# Patient Record
Sex: MALE | Race: WHITE | NOT HISPANIC OR LATINO | Employment: FULL TIME | URBAN - METROPOLITAN AREA
[De-identification: names, ages, dates, MRNs, and addresses within clinical notes are randomized per-mention and may not be internally consistent; named-entity substitution may affect disease eponyms.]

---

## 2017-01-13 ENCOUNTER — OFFICE VISIT (OUTPATIENT)
Dept: DERMATOLOGY | Facility: CLINIC | Age: 52
End: 2017-01-13
Payer: COMMERCIAL

## 2017-01-13 DIAGNOSIS — Z86.018 HISTORY OF DYSPLASTIC NEVUS: ICD-10-CM

## 2017-01-13 DIAGNOSIS — D48.5 NEOPLASM OF UNCERTAIN BEHAVIOR OF SKIN: Primary | ICD-10-CM

## 2017-01-13 PROCEDURE — 88305 TISSUE EXAM BY PATHOLOGIST: CPT | Performed by: DERMATOLOGY

## 2017-01-13 PROCEDURE — 11100 HC BIOPSY SKIN/SUBQ/MUC MEM, SINGLE LESION: CPT | Performed by: DERMATOLOGY

## 2017-01-13 PROCEDURE — 99213 OFFICE O/P EST LOW 20 MIN: CPT | Mod: 25 | Performed by: DERMATOLOGY

## 2017-01-13 RX ORDER — MULTIVIT WITH MINERALS/LUTEIN
1 TABLET ORAL DAILY
COMMUNITY
End: 2023-05-23

## 2017-01-13 NOTE — PROGRESS NOTES
[facility] Dermatology Note      Dermatology Problem List:  1. History of dysplastic nevus  -Junctional dysplastic nevus with mild atypia, left lower back, s/p biopsy, narrowly excised, 11/23/2015  -Compound dysplastic nevus with mild atypia, mid back, s/p biopsy 5/19/2015  -Compound dysplastic nevus with mild to moderate atypia, right lower back, s/p excised with biopsy 5/19/2015  -Compound dysplastic nevus with mild to moderate atypia, central abdomen, s/p biopsy 5/19/2015, s/p re-excision for recurrent pigment 2/2/2016  -Compound dysplastic nevus with severe atypia, left chest, s/p excision 1/13/2015  2. Lesion on left calf excised as a child, approximately age 4    Encounter Date: Jan 13, 2017    CC:  Chief Complaint   Patient presents with     Derm Problem     6 month skin check. Has some itchy spots on the back           History of Present Illness:  Mr. Gatito Collier is a 51 year old male who presents as a follow-up for history of dysplastic nevi. The patient was last seen 6/21/2016 when a biopsy was performed on the right calf. Today, the patient reports that his prior biopsy site on the right calf has healed well. Has some pruritic lesions on the back. The patient reports no other lesions of concern.    Past Medical History:   Patient Active Problem List   Diagnosis     Allergic rhinitis     Benign neoplasm of skin     CARDIOVASCULAR SCREENING; LDL GOAL LESS THAN 160     History of dysplastic nevus     Past Medical History   Diagnosis Date     Calculus of gallbladder without mention of cholecystitis or obstruction      Cholelithiasis     Unspecified disease of pancreas 2/00?     Pancreatitis/ Gall stone     Benign neoplasm of skin, site unspecified      dysplastic nevi     Allergic rhinitis, cause unspecified      Allergic rhinitis     Past Surgical History   Procedure Laterality Date     Hc remove tonsils/adenoids,<11 y/o       T & A <12y.o. 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     Family History:  There is a family history of skin cancer in the patient's maternal grandfather.    Medications:  Current Outpatient Prescriptions   Medication Sig Dispense Refill     loratadine (CLARITIN) 10 MG tablet        Multiple Vitamins-Minerals (PRESERVISION AREDS 2) CAPS Take by mouth daily            Allergies   Allergen Reactions     No Known Drug Allergies          Review of Systems:  -Skin: As above in HPI. No additional skin concerns.  -Const: The patient is generally feeling well today.   Physical exam:  GEN: This is a well developed, well-nourished male in no acute distress, in a pleasant mood.    SKIN: Full skin, which includes the head/face, both arms, chest, back, abdomen,both legs, genitalia and/or groin buttocks, digits and/or nails, was examined.  -There are multiple well healed surgical scars without erythema, nodularity or telangiectasias.  -5mm irregularly shaped pigmented macule on the left lower paraspinal.  -Normal back exam  -There is a well healed surgical scar without erythema, nodularity or telangiectasias or pigmentation, several on the back and chest  -No other lesions of concern on areas examined.     Impression/Plan:  1. History of dysplastic nevi, no clinical evidence of recurrence.   ABCD's of melanoma were reviewed with patient and handout provided.   Recommend sunscreens SPF #30 or greater, protective clothing and avoidance of tanning beds.  2. Pruritus, back. Location not consistent with nostalgia paresthetica, possibly due to scars around the area.     Discussed no evidence of malignancy    Patient declines discussion of Sarna.     No further intervention required at this time.   3. 5mm irregularly shaped pigmented macule, left lower paraspinal. Neoplasm of uncertain behavior. Differential diagnosis  includes dysplastic nevus versus other    Shave biopsy:  After discussion of benefits and risks including but not limited to bleeding/bruising, pain/swelling, infection, scar, incomplete removal, nerve damage/numbness, recurrence, and non-diagnostic biopsy, written consent, verbal consent and photographs were obtained. Time-out was performed. The area was cleaned with isopropyl alcohol. 0.5 mL of 1% lidocaine with epinephrine was injected to obtain adequate anesthesia of the lesion on the left lower paraspinal. A shave biopsy was performed. Hemostasis was achieved with aluminium chloride. Vaseline and a sterile dressing were applied. The patient tolerated the procedure and no complications were noted. The patient was provided with verbal and written post care instructions.     Follow up in 6 months, earlier pending biopsy, earlier for new or changing lesions.     Staff Involved:  Scribe/Staff    Scribe Disclosure:   I, Teena Carias, am serving as a scribe to document services personally performed by Dr. Marisela Juarez, based on data collection and the provider's statements to me.       Provider Disclosure:   I agree with above History, Review of Systems, Physical exam and Plan. I have reviewed the content of the documentation and have edited it as needed. I have personally performed the services documented here and the documentation accurately represents those services and the decisions I have made.     Marisela Juarez MD    Department of Dermatology  [facility]: Phone: [phone], Fax:[phone]  [facility]: Phone: [phone], Fax: [phone]

## 2017-01-13 NOTE — Clinical Note
1/13/2017      RE: Gatito Collier  [address]  [place] [address]       [facility] Dermatology Note      Dermatology Problem List:  1. History of dysplastic nevus  -Junctional dysplastic nevus with mild atypia, left lower back, s/p biopsy, narrowly excised, 11/23/2015  -Compound dysplastic nevus with mild atypia, mid back, s/p biopsy 5/19/2015  -Compound dysplastic nevus with mild to moderate atypia, right lower back, s/p excised with biopsy 5/19/2015  -Compound dysplastic nevus with mild to moderate atypia, central abdomen, s/p biopsy 5/19/2015, s/p re-excision for recurrent pigment 2/2/2016  -Compound dysplastic nevus with severe atypia, left chest, s/p excision 1/13/2015  2. Lesion on left calf excised as a child, approximately age 4    Encounter Date: Jan 13, 2017    CC:  Chief Complaint   Patient presents with     Derm Problem     6 month skin check. Has some itchy spots on the back           History of Present Illness:  Mr. Gatito Collier is a 51 year old male who presents as a follow-up for history of dysplastic nevi. The patient was last seen 6/21/2016 when a biopsy was performed on the right calf. Today, the patient reports that his prior biopsy site on the right calf has healed well. Has some pruritic lesions on the back. The patient reports no other lesions of concern.    Past Medical History:   Patient Active Problem List   Diagnosis     Allergic rhinitis     Benign neoplasm of skin     CARDIOVASCULAR SCREENING; LDL GOAL LESS THAN 160     History of dysplastic nevus     Past Medical History   Diagnosis Date     Calculus of gallbladder without mention of cholecystitis or obstruction      Cholelithiasis     Unspecified disease of pancreas 2/00?     Pancreatitis/ Gall stone     Benign neoplasm of skin, site unspecified      dysplastic nevi     Allergic rhinitis, cause unspecified      Allergic rhinitis     Past Surgical History   Procedure Laterality Date     Hc  remove tonsils/adenoids,<13 y/o       T & A <12y.o. 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     Family History:  There is a family history of skin cancer in the patient's maternal grandfather.    Medications:  Current Outpatient Prescriptions   Medication Sig Dispense Refill     loratadine (CLARITIN) 10 MG tablet        Multiple Vitamins-Minerals (PRESERVISION AREDS 2) CAPS Take by mouth daily            Allergies   Allergen Reactions     No Known Drug Allergies          Review of Systems:  -Skin: As above in HPI. No additional skin concerns.  -Const: The patient is generally feeling well today.   Physical exam:  GEN: This is a well developed, well-nourished male in no acute distress, in a pleasant mood.    SKIN: Full skin, which includes the head/face, both arms, chest, back, abdomen,both legs, genitalia and/or groin buttocks, digits and/or nails, was examined.  -There are multiple well healed surgical scars without erythema, nodularity or telangiectasias.  -5mm irregularly shaped pigmented macule on the left lower paraspinal.  -Normal back exam  -There is a well healed surgical scar without erythema, nodularity or telangiectasias or pigmentation, several on the back and chest  -No other lesions of concern on areas examined.     Impression/Plan:  1. History of dysplastic nevi, no clinical evidence of recurrence.   ABCD's of melanoma were reviewed with patient and handout provided.   Recommend sunscreens SPF #30 or greater, protective clothing and avoidance of tanning beds.  2. Pruritus, back. Location not consistent with nostalgia paresthetica, possibly due to scars around the area.     Discussed no evidence of malignancy    Patient declines discussion of Sarna.     No further intervention required at this time.   3. 5mm irregularly shaped pigmented  macule, left lower paraspinal. Neoplasm of uncertain behavior. Differential diagnosis includes dysplastic nevus versus other    Shave biopsy:  After discussion of benefits and risks including but not limited to bleeding/bruising, pain/swelling, infection, scar, incomplete removal, nerve damage/numbness, recurrence, and non-diagnostic biopsy, written consent, verbal consent and photographs were obtained. Time-out was performed. The area was cleaned with isopropyl alcohol. 0.5 mL of 1% lidocaine with epinephrine was injected to obtain adequate anesthesia of the lesion on the left lower paraspinal. A shave biopsy was performed. Hemostasis was achieved with aluminium chloride. Vaseline and a sterile dressing were applied. The patient tolerated the procedure and no complications were noted. The patient was provided with verbal and written post care instructions.     Follow up in 6 months, earlier pending biopsy, earlier for new or changing lesions.     Staff Involved:  Scribe/Staff    Scribe Disclosure:   I, Teena Carias, am serving as a scribe to document services personally performed by Dr. Marisela Juarez, based on data collection and the provider's statements to me.       Provider Disclosure:   I agree with above History, Review of Systems, Physical exam and Plan. I have reviewed the content of the documentation and have edited it as needed. I have personally performed the services documented here and the documentation accurately represents those services and the decisions I have made.     Marisela Juarez MD    Department of Dermatology  [facility]: Phone: [phone], Fax:[phone]  [facility]: Phone: [phone], Fax: [phone]

## 2017-01-13 NOTE — PATIENT INSTRUCTIONS

## 2017-01-13 NOTE — Clinical Note
"    Patient:  Love Weinstein  :   1965  MRN:     [id number]        Mr.Theodore DIANA Weinstein  [address] [address]        2017    Dear Love Weinstein,     We are writing to inform you of your test results that show a mildly dysplastic nevus. We can recheck this at your follow-up. No more treatment is needed at this time.     Thank you for taking the time to be seen in our dermatology clinic. If you have further questions or concerns, please contact the clinic(see phone number listed below).       Sincerely,     Marisela Juarez MD      Department of Dermatology   [facility]: Phone: [phone], Fax:[phone]   [facility]: Phone: [phone], Fax: [phone]     Resulted Orders   Surgical pathology exam   Result Value Ref Range    Copath Report       Patient Name: LOVE WEINSTEIN  MR#: [id number]  Specimen #:   Collected: 2017  Received: 2017  Reported: 2017 15:48  Ordering Phy(s): MARISELA JUAREZ    For improved result formatting, select 'View Enhanced Report Format'  under Linked Documents section.    SPECIMEN(S):  Shave, left lower paraspinal    FINAL DIAGNOSIS:  Skin, paraspinal, left lower:  - Compound nevus with mild dysplasia - (see description)    I have personally reviewed all specimens and or slides, including the  listed special stains, and used them with my medical judgement to  determine the final diagnosis.    Electronically signed out by:    Francisco Martin D.O., [facility]    CLINICAL HISTORY:  The patient is a 51-year-old male.    GROSS:  The specimen is received in formalin with proper patient's  identification, labeled \"skin biopsy of left lower paraspinal\".  it  consists of a 0.7 x 0.6 x 0.1 cm shave skin biopsy tissue fragment.  The  skin surface shows a pigmented lesion. The base maribel in is inked in  black.  Trisected " and entirely submitted in one cassette. (Dictated by:  Javier Andujar 1/16/2017 10:53 AM)    MICROSCOPIC:  Histologic sections demonstrate a compound melanocytic proliferation  with slight junctional architectural disorder.  Neither pagetoid  disarray nor cytologic atypia is prominent.    CPT Codes:  A: 31124-KX6.T, 17159-ST5.P    TESTING LAB LOCATION:  [facility], [address]   [address] [phone]    COLLECTION SITE:  Client: [facility]  Location: [facility] ()

## 2017-01-13 NOTE — NURSING NOTE
Dermatology Rooming Note    Gatito Collier's goals for this visit include:   Chief Complaint   Patient presents with     Derm Problem     6 month skin check. Has some itchy spots on the back         Is a scribe okay for this visit:YES    Are records needed for this visit(If yes, obtain release of information): Not applicable     Vitals: There were no vitals taken for this visit.    Referring Provider:  ESTABLISHED PATIENT  No address on file

## 2017-01-13 NOTE — MR AVS SNAPSHOT
After Visit Summary   1/13/2017    Gatito Collier    MRN: [id number]           Patient Information     Date Of Birth          1965        Visit Information        Provider Department      1/13/2017 11:15 AM Marisela Juarez MD [facility]        Today's Diagnoses     Neoplasm of uncertain behavior of skin    -  1     History of dysplastic nevus           Care Instructions    Wound Care After a Biopsy    What is a skin biopsy?  A skin biopsy allows the doctor to examine a very small piece of tissue under the microscope to determine the diagnosis and the best treatment for the skin condition. A local anesthetic (numbing medicine)  is injected with a very small needle into the skin area to be tested. A small piece of skin is taken from the area. Sometimes a suture (stitch) is used.     What are the risks of a skin biopsy?  I will experience scar, bleeding, swelling, pain, crusting and redness. I may experience incomplete removal or recurrence. Risks of this procedure are excessive bleeding, bruising, infection, nerve damage, numbness, thick (hypertrophic or keloidal) scar and non-diagnostic biopsy.    How should I care for my wound for the first 24 hours?    Keep the wound dry and covered for 24 hours    If it bleeds, hold direct pressure on the area for 15 minutes. If bleeding does not stop then go to the emergency room    Avoid strenuous exercise the first 1-2 days or as your doctor instructs you    How should I care for the wound after 24 hours?    After 24 hours, remove the bandage    You may bathe or shower as normal    If you had a scalp biopsy, you can shampoo as usual and can use shower water to clean the biopsy site daily    Clean the wound twice a day with gentle soap and water    Do not scrub, be gentle    Apply white petroleum/Vaseline after cleaning the wound with a cotton swab or a clean finger, and keep the site covered with a Bandaid /bandage. Bandages are not  necessary with a scalp biopsy    If you are unable to cover the site with a Bandaid /bandage, re-apply ointment 2-3 times a day to keep the site moist. Moisture will help with healing    Avoid strenuous activity for first 1-2 days    Avoid lakes, rivers, pools, and oceans until the stitches are removed or the site is healed    How do I clean my wound?    Wash hands for 15 minutes with soap or use hand  before all wound care    Clean the wound with gentle soap and water    Apply white petroleum/Vaseline  to wound after it is clean    Replace the Bandaid /bandage to keep the wound covered for the first few days or as instructed by your doctor    If you had a scalp biopsy, warm shower water to the area on a daily basis should suffice    What should I use to clean my wound?     Cotton-tipped applicators (Qtips )    White petroleum jelly (Vaseline ). Use a clean new container and use Q-tips to apply.    Bandaids   as needed    Gentle soap     How should I care for my wound long term?    Do not get your wound dirty    Keep up with wound care for one week or until the area is healed.    A small scab will form and fall off by itself when the area is completely healed. The area will be red and will become pink in color as it heals. Sun protection is very important for how your scar will turn out. Sunscreen with an SPF 30 or greater is recommended once the area is healed.    You should have some soreness but it should be mild and slowly go away over several days. Talk to your doctor about using tylenol for pain,    When should I call my doctor?  If you have increased:     Pain or swelling    Pus or drainage (clear or slightly yellow drainage is ok)    Temperature over 100F    Spreading redness or warmth around wound    When will I hear about my results?  The biopsy results can take 2-3 weeks to come back. The clinic will call you with the results, send you a Konoz message, or have you schedule a follow-up clinic or  phone time to discuss the results. Contact our clinics if you do not hear from us in 3 weeks.     Who should I call with questions?    [facility]: [phone]     [facility]: [phone]    For urgent needs outside of business hours call the [facility] at [phone] and ask for the dermatology resident on call            Follow-ups after your visit        Your next 10 appointments already scheduled     Aug 11, 2017  8:45 AM   Return Visit with Marisela Juarez MD   [facility] ([facility]    [address] [place] [address] [phone]              Who to contact     If you have questions or need follow up information about today's clinic visit or your schedule please contact [facility] directly at [phone].  Normal or non-critical lab and imaging results will be communicated to you by MyChart, letter or phone within 4 business days after the clinic has received the results. If you do not hear from us within 7 days, please contact the clinic through [employer] or phone. If you have a critical or abnormal lab result, we will notify you by phone as soon as possible.  Submit refill requests through [employer] or call your pharmacy and they will forward the refill request to us. Please allow 3 business days for your refill to be completed.          Additional Information About Your Visit        [employer] Information     [employer] gives you secure access to your electronic health record. If you see a primary care provider, you can also send messages to your care team and make appointments. If you have questions, please call your primary care clinic.  If you do not have a primary care provider, please call [phone] and they will assist you.      [employer] is an electronic gateway that provides easy, online access to your medical records. With [employer], you can request a clinic  appointment, read your test results, renew a prescription or communicate with your care team.     To access your existing account, please contact your [facility] or call [phone] for assistance.        Care EveryWhere ID     This is your Care EveryWhere ID. This could be used by other organizations to access your [facility] medical records  [id number]         Blood Pressure from Last 3 Encounters:   02/02/16 128/71   01/13/15 118/72   10/10/07 106/62    Weight from Last 3 Encounters:   01/13/15 81.647 kg (180 lb)   10/10/07 83.462 kg (184 lb)   01/23/07 84.369 kg (186 lb)              We Performed the Following     BIOPSY SKIN/SUBQ/MUC MEM, SINGLE LESION     Surgical pathology exam        Primary Care Provider Office Phone # Fax #    Gregory G Schoen, [phone]       [facility] [address] [place] [address]        Thank you!     Thank you for choosing [facility]  for your care. Our goal is always to provide you with excellent care. Hearing back from our patients is one way we can continue to improve our services. Please take a few minutes to complete the written survey that you may receive in the mail after your visit with us. Thank you!             Your Updated Medication List - Protect others around you: Learn how to safely use, store and throw away your medicines at www.disposemymeds.org.          This list is accurate as of: 1/13/17 11:37 AM.  Always use your most recent med list.                   Brand Name Dispense Instructions for use    loratadine 10 MG tablet    CLARITIN         * PRESERVISION AREDS 2 Caps      Take by mouth daily       * CENTRUM SILVER per tablet      Take 1 tablet by mouth daily       * Notice:  This list has 2 medication(s) that are the same as other medications prescribed for you. Read the directions carefully, and ask your doctor or other care provider to review them with you.

## 2017-01-18 LAB — COPATH REPORT: NORMAL

## 2017-07-25 ENCOUNTER — TELEPHONE (OUTPATIENT)
Dept: DERMATOLOGY | Facility: CLINIC | Age: 52
End: 2017-07-25

## 2017-07-25 NOTE — TELEPHONE ENCOUNTER
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     I left a message for patient to call [facility].  Patient has an appointment with Dr. hamilton on 8/11/17 for skin check.  Provider is not available and appointment needs to be rescheduled.       Call Center - ok for you to notify patient of the following:    We are recruiting dermatologists, but unfortunately we do not have a dermatologist at [facility] to reschedule you with at this time.    The following is a list of dermatology providers that will be able to see you during this transition period.  They are able to see your current dermatology medical record.  Hours will vary by location.    MD Aspen Walters PA Kristen McCarthy, PA  [facility]  [address] [place] [address]  [phone]    Ramses Arevalo MD  [facility]  [address] [place] [address]  [phone]    [facility]    Dermatology Department  [address] [place] [address]  [phone]

## 2017-11-20 ENCOUNTER — TELEPHONE (OUTPATIENT)
Dept: SURGERY | Facility: CLINIC | Age: 52
End: 2017-11-20

## 2017-11-20 NOTE — TELEPHONE ENCOUNTER
Received orders from [facility] to schedule a colonoscopy. Patient called and is scheduled on 12/1/17 with Dr. Kunz arrive at 0630 procedure 0730.  Patient needs prep instructions, he will  in ED on 11/27/17.

## 2017-12-01 ENCOUNTER — HOSPITAL ENCOUNTER (OUTPATIENT)
Facility: CLINIC | Age: 52
Discharge: HOME | End: 2017-12-01
Attending: INTERNAL MEDICINE | Admitting: INTERNAL MEDICINE
Payer: COMMERCIAL

## 2017-12-01 ENCOUNTER — SURGERY (OUTPATIENT)
Age: 52
End: 2017-12-01

## 2017-12-01 VITALS
HEART RATE: 71 BPM | RESPIRATION RATE: 16 BRPM | OXYGEN SATURATION: 95 % | DIASTOLIC BLOOD PRESSURE: 72 MMHG | SYSTOLIC BLOOD PRESSURE: 100 MMHG | TEMPERATURE: 97.1 F

## 2017-12-01 LAB — COLONOSCOPY: NORMAL

## 2017-12-01 PROCEDURE — 25000125 ZZHC RX 250: Performed by: INTERNAL MEDICINE

## 2017-12-01 PROCEDURE — 88305 TISSUE EXAM BY PATHOLOGIST: CPT | Performed by: INTERNAL MEDICINE

## 2017-12-01 PROCEDURE — 45380 COLONOSCOPY AND BIOPSY: CPT | Performed by: INTERNAL MEDICINE

## 2017-12-01 PROCEDURE — 88305 TISSUE EXAM BY PATHOLOGIST: CPT | Mod: 26 | Performed by: INTERNAL MEDICINE

## 2017-12-01 PROCEDURE — 25000128 H RX IP 250 OP 636: Performed by: INTERNAL MEDICINE

## 2017-12-01 PROCEDURE — 40000296 ZZH STATISTIC ENDO RECOVERY CLASS 1:2 FIRST HOUR: Performed by: INTERNAL MEDICINE

## 2017-12-01 PROCEDURE — G0500 MOD SEDAT ENDO SERVICE >5YRS: HCPCS

## 2017-12-01 RX ORDER — FENTANYL CITRATE 50 UG/ML
INJECTION, SOLUTION INTRAMUSCULAR; INTRAVENOUS PRN
Status: DISCONTINUED | OUTPATIENT
Start: 2017-12-01 | End: 2017-12-01 | Stop reason: HOSPADM

## 2017-12-01 RX ORDER — LIDOCAINE 40 MG/G
CREAM TOPICAL
Status: DISCONTINUED | OUTPATIENT
Start: 2017-12-01 | End: 2017-12-01 | Stop reason: HOSPADM

## 2017-12-01 RX ORDER — ONDANSETRON 2 MG/ML
4 INJECTION INTRAMUSCULAR; INTRAVENOUS
Status: DISCONTINUED | OUTPATIENT
Start: 2017-12-01 | End: 2017-12-01 | Stop reason: HOSPADM

## 2017-12-01 RX ADMIN — MIDAZOLAM HYDROCHLORIDE 1 MG: 1 INJECTION, SOLUTION INTRAMUSCULAR; INTRAVENOUS at 09:14

## 2017-12-01 RX ADMIN — MIDAZOLAM HYDROCHLORIDE 1 MG: 1 INJECTION, SOLUTION INTRAMUSCULAR; INTRAVENOUS at 09:15

## 2017-12-01 RX ADMIN — FENTANYL CITRATE 50 MCG: 50 INJECTION, SOLUTION INTRAMUSCULAR; INTRAVENOUS at 09:17

## 2017-12-01 RX ADMIN — FENTANYL CITRATE 50 MCG: 50 INJECTION, SOLUTION INTRAMUSCULAR; INTRAVENOUS at 09:12

## 2017-12-01 RX ADMIN — MIDAZOLAM HYDROCHLORIDE 1 MG: 1 INJECTION, SOLUTION INTRAMUSCULAR; INTRAVENOUS at 09:13

## 2017-12-01 RX ADMIN — MIDAZOLAM HYDROCHLORIDE 2 MG: 1 INJECTION, SOLUTION INTRAMUSCULAR; INTRAVENOUS at 09:12

## 2017-12-01 RX ADMIN — LIDOCAINE HYDROCHLORIDE 1 ML: 10 INJECTION, SOLUTION EPIDURAL; INFILTRATION; INTRACAUDAL; PERINEURAL at 08:19

## 2017-12-01 NOTE — CONSULTS
[facility] GI Pre-Procedure Physical Assessment    Gatito Collier MRN# [id number]   Age: 52 year old YOB: 1965      Date of Surgery: 12/1/2017  Location [facility]      Date of Exam 12/1/2017 Facility (Same day)       Home clinic: [facility]  Primary care provider: Schoen, Gregory G         Active problem list:   Patient Active Problem List   Diagnosis     Allergic rhinitis     Benign neoplasm of skin     CARDIOVASCULAR SCREENING; LDL GOAL LESS THAN 160     History of dysplastic nevus            Medications (include herbals and vitamins):   Any Plavix use in the last 7 days?  No     Current Facility-Administered Medications   Medication     lidocaine 1 % 1 mL     lidocaine (LMX4) kit     sodium chloride (PF) 0.9% PF flush 3 mL     sodium chloride (PF) 0.9% PF flush 3 mL     ondansetron (ZOFRAN) injection 4 mg             Allergies:      Allergies   Allergen Reactions     No Known Drug Allergies      Allergy to Latex?  No  Allergy to tape?    No          Social History:     Social History   Substance Use Topics     Smoking status: Never Smoker     Smokeless tobacco: Not on file     Alcohol use 2.0 oz/week            Physical Exam:   All vitals have been reviewed  Blood pressure 118/74, pulse 81, temperature 97.1  F (36.2  C), temperature source Oral, resp. rate 14, SpO2 96 %.  Airway assessment:   Patient is able to open mouth wide  Patient is able to stick out tongue      Lungs:   No increased work of breathing, good air exchange, clear to auscultation bilaterally, no crackles or wheezing      Cardiovascular:   Normal apical impulse, regular rate and rhythm, normal S1 and S2, no S3 or S4, and no murmur noted           Lab / Radiology Results:   All laboratory data reviewed          Assessment:   Appropriately NPO  Chief complaint or anatomic assessment of involved area: hematochezia and screening         Plan:   Moderate (conscious) sedation     Patient's  active problems diagnostically and therapeutically optimized for the planned procedure  Risks, benefits, alternatives to sedation and blood explained and consent obtained  Risks, benefits, alternatives to procedure explained and consent obtained  Orders and progress notes are in the chart  Discharge from Phase 1 and / or Phase 2 recovery when patient meets criteria    I have reviewed the history and physical, lab finding(s), diagnostic data, medicaitons, and the plan for sedation.  I have determined this patient to be an appropriate candidate for the planned sedation / procedure and have reassessed the patient immediately prior to sedation / procedure.    I have personally and medically directed the administration of medications used.    Michael Knuz MD

## 2017-12-05 LAB — COPATH REPORT: NORMAL

## 2017-12-06 ENCOUNTER — TRANSFERRED RECORDS (OUTPATIENT)
Dept: HEALTH INFORMATION MANAGEMENT | Facility: CLINIC | Age: 52
End: 2017-12-06

## 2018-03-23 ENCOUNTER — OFFICE VISIT (OUTPATIENT)
Dept: DERMATOLOGY | Facility: CLINIC | Age: 53
End: 2018-03-23
Payer: COMMERCIAL

## 2018-03-23 DIAGNOSIS — L82.1 SEBORRHEIC KERATOSIS: ICD-10-CM

## 2018-03-23 DIAGNOSIS — D22.9 MULTIPLE BENIGN NEVI: Primary | ICD-10-CM

## 2018-03-23 DIAGNOSIS — Z86.018 HISTORY OF DYSPLASTIC NEVUS: ICD-10-CM

## 2018-03-23 PROCEDURE — 99213 OFFICE O/P EST LOW 20 MIN: CPT | Performed by: DERMATOLOGY

## 2018-03-23 NOTE — PROGRESS NOTES
[facility] Dermatology Note      Dermatology Problem List:  1. History of dysplastic nevus  -Compound nevus with mild dysplasia, left lower paraspinal, s/p biopsy 1/13/2017   -Junctional dysplastic nevus with mild atypia, left lower back, s/p biopsy, narrowly excised, 11/23/2015  -Compound dysplastic nevus with mild atypia, mid back, s/p biopsy 5/19/2015  -Compound dysplastic nevus with mild to moderate atypia, right lower back, s/p excised with biopsy 5/19/2015  -Compound dysplastic nevus with mild to moderate atypia, central abdomen, s/p biopsy 5/19/2015, s/p re-excision for recurrent pigment 2/2/2016  -Compound dysplastic nevus with severe atypia, left chest, s/p excision 1/13/2015  2. Lesion on left calf excised as a child, approximately age 4    Encounter Date: Mar 23, 2018    CC:  Chief Complaint   Patient presents with     RECHECK     6 month recheck- spot on nose where glasses sit          History of Present Illness:  Mr. Gatito Collier is a 52 year old male who presents as a follow-up for spot on the nose where glasses sit. The patient was last seen 1/13/2017 when the patient was treated with biopsies. Only 1 lesion near nose he would like checked today. No further intervention required at this time.       Past Medical History:   Patient Active Problem List   Diagnosis     Allergic rhinitis     Benign neoplasm of skin     CARDIOVASCULAR SCREENING; LDL GOAL LESS THAN 160     History of dysplastic nevus     Past Medical History:   Diagnosis Date     Allergic rhinitis, cause unspecified     Allergic rhinitis     Benign neoplasm of skin, site unspecified     dysplastic nevi     Calculus of gallbladder without mention of cholecystitis or obstruction     Cholelithiasis     Unspecified disease of pancreas 2/00?    Pancreatitis/ Gall stone     Past Surgical History:   Procedure Laterality Date     COLONOSCOPY N/A 12/1/2017    Procedure: COMBINED COLONOSCOPY, SINGLE OR MULTIPLE  BIOPSY/POLYPECTOMY BY BIOPSY;  colonoscopy. POlypectomy per forceps;  Surgeon: Michael Kuzn MD;  Location: [facility]     HC EXC BENIGN SKIN LESION TRUNK/ARM/LEG 2.1-3.0 CM      birthmark removal, Left calf - approx age 4     HC REMOVE TONSILS/ADENOIDS,<13 Y/O      T & A <12y.o. 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    Kept in chart for convenience.     Family History:  There is a family history of skin cancer in the patient's maternal grandfather.  Kept in chart for convenience.       Medications:  Current Outpatient Prescriptions   Medication Sig Dispense Refill     Multiple Vitamins-Minerals (CENTRUM SILVER) per tablet Take 1 tablet by mouth daily       loratadine (CLARITIN) 10 MG tablet        Multiple Vitamins-Minerals (PRESERVISION AREDS 2) CAPS Take by mouth daily         Allergies   Allergen Reactions     No Known Drug Allergies        Review of Systems:  -Constitutional: The patient is feeling generally well.   -Skin: As above in HPI. No additional skin concerns.    Physical exam:  Vitals: There were no vitals taken for this visit.  GEN: This is a well developed, well-nourished male in no acute distress, in a pleasant mood.    SKIN: Full skin, which includes the head/face, both arms, chest, back, abdomen,both legs, genitalia and/or groin buttocks, digits and/or nails, was examined  -There is no erythema, telangectasias, nodularity, or pigmentation on the left lower back, mid back, right lower back, central abdomen, left chest.  -Multiple regular brown pigmented macules and papules are identified on the trunk and extremities.   -No other lesions of concern on areas examined.       Impression/Plan:  1. History of DN: no evidence of recurrence     ABCDs of melanoma were discussed and self skin checks were advised.     Increased risk for melanoma discussed     Recommend sunscreens SPF #30 or greater, protective clothing and avoidance of tanning  beds.    2. Multiple benign nevi     No further intervention required at this time.   3. Right medial canthus/nasal sidewall, early SK, will monitor    Follow-up in 9 months, earlier for new or changing lesions.       Staff Involved:  Scribe/Staff      Scribe Disclosure:   I, Edith Kori, am serving as a scribe to document services personally performed by Dr. Marisela Juarez, based on data collection and the provider's statements to me.       Provider Disclosure:   The documentation recorded by the scribe accurately reflects the services I personally performed and the decisions made by me.    Marisela Juarez MD    Department of Dermatology  [facility]: Phone: [phone], Fax:[phone]  [facility] [place]: Phone: [phone], Fax: [phone]    Provider Disclosure:   The documentation recorded by the scribe accurately reflects the services I personally performed and the decisions made by me.    Marisela Juarez MD    Department of Dermatology  [facility]: Phone: [phone], Fax:[phone]  [facility] [place]: Phone: [phone], Fax: [phone]

## 2018-03-23 NOTE — NURSING NOTE
"Gatito Collier's goals for this visit include:   Chief Complaint   Patient presents with     RECHECK     6 month recheck- spot on nose where glasses sit        He requests these members of his care team be copied on today's visit information: yes     PCP: Schoen, Gregory G    Referring Provider:  ESTABLISHED PATIENT  No address on file    Chief Complaint   Patient presents with     RECHECK     6 month recheck- spot on nose where glasses sit        Initial There were no vitals taken for this visit. Estimated body mass index is 24.08 kg/(m^2) as calculated from the following:    Height as of 10/10/07: 1.842 m (6' 0.5\").    Weight as of 1/13/15: 81.6 kg (180 lb).  Medication Reconciliation: complete    Do you need any medication refills at today's visit? No     Amorrae MARCO ANTONIO Morton        "

## 2018-03-23 NOTE — LETTER
3/23/2018         RE: Gatito Collier  [address] [address]        Dear Colleague,    Thank you for referring your patient, Gatito Collier, to the [facility]. Please see a copy of my visit note below.    [facility] Dermatology Note      Dermatology Problem List:  1. History of dysplastic nevus  -Compound nevus with mild dysplasia, left lower paraspinal, s/p biopsy 1/13/2017   -Junctional dysplastic nevus with mild atypia, left lower back, s/p biopsy, narrowly excised, 11/23/2015  -Compound dysplastic nevus with mild atypia, mid back, s/p biopsy 5/19/2015  -Compound dysplastic nevus with mild to moderate atypia, right lower back, s/p excised with biopsy 5/19/2015  -Compound dysplastic nevus with mild to moderate atypia, central abdomen, s/p biopsy 5/19/2015, s/p re-excision for recurrent pigment 2/2/2016  -Compound dysplastic nevus with severe atypia, left chest, s/p excision 1/13/2015  2. Lesion on left calf excised as a child, approximately age 4    Encounter Date: Mar 23, 2018    CC:  Chief Complaint   Patient presents with     RECHECK     6 month recheck- spot on nose where glasses sit          History of Present Illness:  Mr. Gatito Collier is a 52 year old male who presents as a follow-up for spot on the nose where glasses sit. The patient was last seen 1/13/2017 when the patient was treated with biopsies. Only 1 lesion near nose he would like checked today. No further intervention required at this time.       Past Medical History:   Patient Active Problem List   Diagnosis     Allergic rhinitis     Benign neoplasm of skin     CARDIOVASCULAR SCREENING; LDL GOAL LESS THAN 160     History of dysplastic nevus     Past Medical History:   Diagnosis Date     Allergic rhinitis, cause unspecified     Allergic rhinitis     Benign neoplasm of skin, site unspecified     dysplastic nevi     Calculus of gallbladder without mention of  cholecystitis or obstruction     Cholelithiasis     Unspecified disease of pancreas 2/00?    Pancreatitis/ Gall stone     Past Surgical History:   Procedure Laterality Date     COLONOSCOPY N/A 12/1/2017    Procedure: COMBINED COLONOSCOPY, SINGLE OR MULTIPLE BIOPSY/POLYPECTOMY BY BIOPSY;  colonoscopy. POlypectomy per forceps;  Surgeon: Michael Kunz MD;  Location: [facility]     HC EXC BENIGN SKIN LESION TRUNK/ARM/LEG 2.1-3.0 CM      birthmark removal, Left calf - approx age 4     HC REMOVE TONSILS/ADENOIDS,<11 Y/O      T & A <12y.o. 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     Family History:  There is a family history of skin cancer in the patient's maternal grandfather.  Kept in chart for convenience.       Medications:  Current Outpatient Prescriptions   Medication Sig Dispense Refill     Multiple Vitamins-Minerals (CENTRUM SILVER) per tablet Take 1 tablet by mouth daily       loratadine (CLARITIN) 10 MG tablet        Multiple Vitamins-Minerals (PRESERVISION AREDS 2) CAPS Take by mouth daily         Allergies   Allergen Reactions     No Known Drug Allergies        Review of Systems:  -Constitutional: The patient is feeling generally well.   -Skin: As above in HPI. No additional skin concerns.    Physical exam:  Vitals: There were no vitals taken for this visit.  GEN: This is a well developed, well-nourished male in no acute distress, in a pleasant mood.    SKIN: Full skin, which includes the head/face, both arms, chest, back, abdomen,both legs, genitalia and/or groin buttocks, digits and/or nails, was examined  -There is no erythema, telangectasias, nodularity, or pigmentation on the left lower back, mid back, right lower back, central abdomen, left chest.  -Multiple regular brown pigmented macules and papules are identified on the trunk and extremities.   -No other lesions of concern on areas examined.        Impression/Plan:  1. History of DN: no evidence of recurrence     ABCDs of melanoma were discussed and self skin checks were advised.     Increased risk for melanoma discussed     Recommend sunscreens SPF #30 or greater, protective clothing and avoidance of tanning beds.    2. Multiple benign nevi     No further intervention required at this time.   3. Right medial canthus/nasal sidewall, early SK, will monitor    Follow-up in 9 months, earlier for new or changing lesions.       Staff Involved:  Scribe/Staff      Scribe Disclosure:   I, Edith Sheikh, am serving as a scribe to document services personally performed by Dr. Marisela Juarez, based on data collection and the provider's statements to me.       Again, thank you for allowing me to participate in the care of your patient.        Sincerely,        Marisela Juarez MD

## 2018-03-23 NOTE — MR AVS SNAPSHOT
After Visit Summary   3/23/2018    Gatito Collier    MRN: [id number]           Patient Information     Date Of Birth          1965        Visit Information        Provider Department      3/23/2018 12:15 PM Marisela Juarez MD [facility]        Today's Diagnoses     Multiple benign nevi    -  1    History of dysplastic nevus        Seborrheic keratosis           Follow-ups after your visit        Follow-up notes from your care team     Return in about 9 months (around 12/23/2018) for hx of dysplastic nevus.      Who to contact     If you have questions or need follow up information about today's clinic visit or your schedule please contact [facility] directly at [phone].  Normal or non-critical lab and imaging results will be communicated to you by Neprishart, letter or phone within 4 business days after the clinic has received the results. If you do not hear from us within 7 days, please contact the clinic through Neprishart or phone. If you have a critical or abnormal lab result, we will notify you by phone as soon as possible.  Submit refill requests through Collusion or call your pharmacy and they will forward the refill request to us. Please allow 3 business days for your refill to be completed.          Additional Information About Your Visit        MyChart Information     Collusion gives you secure access to your electronic health record. If you see a primary care provider, you can also send messages to your care team and make appointments. If you have questions, please call your primary care clinic.  If you do not have a primary care provider, please call [phone] and they will assist you.      Collusion is an electronic gateway that provides easy, online access to your medical records. With Collusion, you can request a clinic appointment, read your test results, renew a prescription or communicate with your care team.     To access your existing account,  please contact your [facility] or call [phone] for assistance.        Care EveryWhere ID     This is your Care EveryWhere ID. This could be used by other organizations to access your [place] medical records  [id number]         Blood Pressure from Last 3 Encounters:   12/01/17 100/72   02/02/16 128/71   01/13/15 118/72    Weight from Last 3 Encounters:   01/13/15 81.6 kg (180 lb)   10/10/07 83.5 kg (184 lb)   01/23/07 84.4 kg (186 lb)              Today, you had the following     No orders found for display       Primary Care Provider Office Phone # Fax #    Gregory G Schoen, [phone]       [address] [place] [address]        Equal Access to Services     [facility]: Hadii luci isaac hadasho Soomaali, waaxda luqadaha, qaybta kaalmada adeegyada, deysi powell . So [facility] [phone].    ATENCIÓN: Si habla español, tiene a martins disposición servicios gratuitos de asistencia lingüística. Jeff al [phone].    We comply with applicable federal civil rights laws and [place] laws. We do not discriminate on the basis of race, color, national origin, age, disability, sex, sexual orientation, or gender identity.            Thank you!     Thank you for choosing [facility]  for your care. Our goal is always to provide you with excellent care. Hearing back from our patients is one way we can continue to improve our services. Please take a few minutes to complete the written survey that you may receive in the mail after your visit with us. Thank you!             Your Updated Medication List - Protect others around you: Learn how to safely use, store and throw away your medicines at www.disposemymeds.org.          This list is accurate as of 3/23/18 12:47 PM.  Always use your most recent med list.                   Brand Name Dispense Instructions for use Diagnosis    loratadine 10 MG tablet    CLARITIN          *  PRESERVISION AREDS 2 Caps      Take by mouth daily        * CENTRUM SILVER per tablet      Take 1 tablet by mouth daily        * Notice:  This list has 2 medication(s) that are the same as other medications prescribed for you. Read the directions carefully, and ask your doctor or other care provider to review them with you.

## 2018-07-11 DIAGNOSIS — Z12.5 SCREENING FOR PROSTATE CANCER: Primary | ICD-10-CM

## 2018-07-11 DIAGNOSIS — Z13.6 CARDIOVASCULAR SCREENING; LDL GOAL LESS THAN 160: ICD-10-CM

## 2018-07-11 DIAGNOSIS — Z13.6 CARDIOVASCULAR SCREENING; LDL GOAL LESS THAN 160: Primary | ICD-10-CM

## 2018-07-11 LAB
CHOLEST SERPL-MCNC: 227 MG/DL
HDLC SERPL-MCNC: 49 MG/DL
LDLC SERPL CALC-MCNC: 158 MG/DL
NONHDLC SERPL-MCNC: 178 MG/DL
TRIGL SERPL-MCNC: 100 MG/DL

## 2018-07-11 PROCEDURE — G0103 PSA SCREENING: HCPCS | Performed by: FAMILY MEDICINE

## 2018-07-11 PROCEDURE — 84460 ALANINE AMINO (ALT) (SGPT): CPT | Performed by: FAMILY MEDICINE

## 2018-07-11 PROCEDURE — 80061 LIPID PANEL: CPT | Performed by: FAMILY MEDICINE

## 2018-07-11 PROCEDURE — 36415 COLL VENOUS BLD VENIPUNCTURE: CPT | Performed by: FAMILY MEDICINE

## 2018-07-12 LAB
ALT SERPL W P-5'-P-CCNC: 28 U/L (ref 0–70)
PSA SERPL-ACNC: 0.58 UG/L (ref 0–4)

## 2018-12-10 NOTE — PROGRESS NOTES
[facility] Dermatology Note      Dermatology Problem List:  1. History of dysplastic nevus  -Compound nevus with mild dysplasia, left lower paraspinal, s/p biopsy 1/13/2017   -Junctional dysplastic nevus with mild atypia, left lower back, s/p biopsy, narrowly excised, 11/23/2015  -Compound dysplastic nevus with mild atypia, mid back, s/p biopsy 5/19/2015  -Compound dysplastic nevus with mild to moderate atypia, right lower back, s/p excised with biopsy 5/19/2015  -Compound dysplastic nevus with mild to moderate atypia, central abdomen, s/p biopsy 5/19/2015, s/p re-excision for recurrent pigment 2/2/2016  -Compound dysplastic nevus with severe atypia, left chest, s/p excision 1/13/2015  2. Lesion on left calf excised as a child, approximately age 4    Encounter Date: Dec 11, 2018    CC:  Chief Complaint   Patient presents with     RECHECK     Eric is returning for a recheck; several areas of concern today.         History of Present Illness:  Mr. Gatito Collier is a 53 year old male who a history of dysplastic nevi. He was last seen in dermatology on 3/23/18 at which time a lesion on the right nasal sidewall was flagged for further monitoring. Since the last visit, the patient reports that he has noticed no changes in this lesion - still present. Other than this spot, the patient has no other concerns. Denies any bleeding, crusting, or changing lesions that he's noticed.     Past Medical History:   Patient Active Problem List   Diagnosis     Allergic rhinitis     Benign neoplasm of skin     CARDIOVASCULAR SCREENING; LDL GOAL LESS THAN 160     History of dysplastic nevus     Past Medical History:   Diagnosis Date     Allergic rhinitis, cause unspecified     Allergic rhinitis     Benign neoplasm of skin, site unspecified     dysplastic nevi     Calculus of gallbladder without mention of cholecystitis or obstruction     Cholelithiasis     Unspecified disease of pancreas 2/00?    Pancreatitis/ Gall  stone     Past Surgical History:   Procedure Laterality Date     COLONOSCOPY N/A 2017    Procedure: COMBINED COLONOSCOPY, SINGLE OR MULTIPLE BIOPSY/POLYPECTOMY BY BIOPSY;  colonoscopy. POlypectomy per forceps;  Surgeon: Michael Kunz MD;  Location: [facility]     HC EXC BENIGN SKIN LESION TRUNK/ARM/LEG 2.1-3.0 CM      birthmark removal, Left calf - approx age 4     HC REMOVE TONSILS/ADENOIDS,<11 Y/O      T & A <12y.o. 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    Kept in chart for convenience.     Family History:  Family History   Problem Relation Age of Onset     Hypertension Mother      Lipids Mother      C.A.D. Maternal Grandmother          of MI age 67     Cerebrovascular Disease Maternal Grandmother         first at age 35     Hypertension Maternal Grandmother      Heart Disease Maternal Grandmother         CHF     Cancer Maternal Grandfather         Pancreatic CA age 70/melanoma     Genitourinary Problems Brother         kidney stone     Anesthesia Reaction No family hx of      Melanoma No family hx of      Skin Cancer No family hx of      There is a family history of skin cancer in the patient's maternal grandfather.  Kept in chart for convenience.       Medications:  Current Outpatient Medications   Medication Sig Dispense Refill     loratadine (CLARITIN) 10 MG tablet Take 10 mg by mouth as needed        Multiple Vitamins-Minerals (CENTRUM SILVER) per tablet Take 1 tablet by mouth daily       Multiple Vitamins-Minerals (PRESERVISION AREDS 2) CAPS Take by mouth daily         Allergies   Allergen Reactions     No Known Drug Allergies        Review of Systems:  -Constitutional: Otherwise feeling well, in usual state of health.   -Skin: As above in HPI. No  additional skin concerns.    Physical exam:  Vitals: There were no vitals taken for this visit.  GEN: This is a well developed, well-nourished male in no acute distress, in a pleasant mood.    SKIN: Full skin, which includes the head/face, both arms, chest, back, abdomen,both legs, genitalia and/or groin buttocks, digits and/or nails, was examined.Scribe present  - Tan macule with slight erythema on the right nasal sidewall.   - Multiple regular brown pigmented macules and papules are identified on the trunk and extremitites.   -There is no erythema, telangectasias, nodularity, or pigmentation on the left lower back, mid back, right lower back, central abdomen, left chest.   -No other lesions of concern on areas examined.       Impression/Plan:  1. History of DN: no evidence of recurrence     ABCDs of melanoma were discussed and self skin checks were advised.     Recommend sunscreens SPF #30 or greater, protective clothing and avoidance of tanning beds.    When the Photo Finder is ready, we will plan to send patient to see Dr. Benites for mole mapping.     2. Multiple benign nevi     No further intervention required at this time.     3. Right medial canthus/nasal sidewall, solar lentigo    We will continue to monitor this lesion. Report any changes    Follow-up in  1 year, plan for fotofinder when up and going.     Staff Involved:  Scribe/Staff    Scribe Disclosure  I, Raghu Berger, am serving as a scribe to document services personally performed by Dr. Marisela Juarez MD, based on data collection and the provider's statements to me.     Provider Disclosure:   The documentation recorded by the scribe accurately reflects the services I personally performed and the decisions made by me.    Marisela Juarez MD    Department of Dermatology  [facility]: Phone: [phone], Fax:[phone]  [facility]  Center: Phone: [phone], Fax: [phone]

## 2018-12-11 ENCOUNTER — OFFICE VISIT (OUTPATIENT)
Dept: DERMATOLOGY | Facility: CLINIC | Age: 53
End: 2018-12-11
Payer: COMMERCIAL

## 2018-12-11 DIAGNOSIS — Z86.018 HISTORY OF DYSPLASTIC NEVUS: Primary | ICD-10-CM

## 2018-12-11 DIAGNOSIS — D22.9 MULTIPLE BENIGN NEVI: ICD-10-CM

## 2018-12-11 PROCEDURE — 99213 OFFICE O/P EST LOW 20 MIN: CPT | Performed by: DERMATOLOGY

## 2018-12-11 ASSESSMENT — PAIN SCALES - GENERAL: PAINLEVEL: NO PAIN (0)

## 2018-12-11 NOTE — LETTER
12/11/2018         RE: Gatito Collier  [address] [address]        Dear Colleague,    Thank you for referring your patient, Gatito Collier, to the [facility]. Please see a copy of my visit note below.    [facility] Dermatology Note      Dermatology Problem List:  1. History of dysplastic nevus  -Compound nevus with mild dysplasia, left lower paraspinal, s/p biopsy 1/13/2017   -Junctional dysplastic nevus with mild atypia, left lower back, s/p biopsy, narrowly excised, 11/23/2015  -Compound dysplastic nevus with mild atypia, mid back, s/p biopsy 5/19/2015  -Compound dysplastic nevus with mild to moderate atypia, right lower back, s/p excised with biopsy 5/19/2015  -Compound dysplastic nevus with mild to moderate atypia, central abdomen, s/p biopsy 5/19/2015, s/p re-excision for recurrent pigment 2/2/2016  -Compound dysplastic nevus with severe atypia, left chest, s/p excision 1/13/2015  2. Lesion on left calf excised as a child, approximately age 4    Encounter Date: Dec 11, 2018    CC:  Chief Complaint   Patient presents with     RECHECK     Eric is returning for a recheck; several areas of concern today.         History of Present Illness:  Mr. Gatito Collier is a 53 year old male who a history of dysplastic nevi. He was last seen in dermatology on 3/23/18 at which time a lesion on the right nasal sidewall was flagged for further monitoring. Since the last visit, the patient reports that he has noticed no changes in this lesion - still present. Other than this spot, the patient has no other concerns. Denies any bleeding, crusting, or changing lesions that he's noticed.     Past Medical History:   Patient Active Problem List   Diagnosis     Allergic rhinitis     Benign neoplasm of skin     CARDIOVASCULAR SCREENING; LDL GOAL LESS THAN 160     History of dysplastic nevus     Past Medical History:   Diagnosis Date     Allergic rhinitis,  cause unspecified     Allergic rhinitis     Benign neoplasm of skin, site unspecified     dysplastic nevi     Calculus of gallbladder without mention of cholecystitis or obstruction     Cholelithiasis     Unspecified disease of pancreas ?    Pancreatitis/ Gall stone     Past Surgical History:   Procedure Laterality Date     COLONOSCOPY N/A 2017    Procedure: COMBINED COLONOSCOPY, SINGLE OR MULTIPLE BIOPSY/POLYPECTOMY BY BIOPSY;  colonoscopy. POlypectomy per forceps;  Surgeon: Michael Kunz MD;  Location: [facility]     HC EXC BENIGN SKIN LESION TRUNK/ARM/LEG 2.1-3.0 CM      birthmark removal, Left calf - approx age 4     HC REMOVE TONSILS/ADENOIDS,<11 Y/O      T & A <12y.o. 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    Kept in chart for convenience.     Family History:  Family History   Problem Relation Age of Onset     Hypertension Mother      Lipids Mother      C.A.D. Maternal Grandmother          of MI age 67     Cerebrovascular Disease Maternal Grandmother         first at age 35     Hypertension Maternal Grandmother      Heart Disease Maternal Grandmother         CHF     Cancer Maternal Grandfather         Pancreatic CA age 70/melanoma     Genitourinary Problems Brother         kidney stone     Anesthesia Reaction No family hx of      Melanoma No family hx of      Skin Cancer No family hx of      There is a family history of skin cancer in the patient's maternal grandfather.  Kept in chart for convenience.       Medications:  Current Outpatient Medications   Medication Sig Dispense Refill     loratadine (CLARITIN) 10 MG tablet Take 10 mg by mouth as needed        Multiple Vitamins-Minerals (CENTRUM SILVER) per tablet Take 1 tablet by mouth daily       Multiple  Vitamins-Minerals (PRESERVISION AREDS 2) CAPS Take by mouth daily         Allergies   Allergen Reactions     No Known Drug Allergies        Review of Systems:  -Constitutional: Otherwise feeling well, in usual state of health.   -Skin: As above in HPI. No additional skin concerns.    Physical exam:  Vitals: There were no vitals taken for this visit.  GEN: This is a well developed, well-nourished male in no acute distress, in a pleasant mood.    SKIN: Full skin, which includes the head/face, both arms, chest, back, abdomen,both legs, genitalia and/or groin buttocks, digits and/or nails, was examined.Scribe present  - Tan macule with slight erythema on the right nasal sidewall.   - Multiple regular brown pigmented macules and papules are identified on the trunk and extremitites.   -There is no erythema, telangectasias, nodularity, or pigmentation on the left lower back, mid back, right lower back, central abdomen, left chest.   -No other lesions of concern on areas examined.       Impression/Plan:  1. History of DN: no evidence of recurrence     ABCDs of melanoma were discussed and self skin checks were advised.     Recommend sunscreens SPF #30 or greater, protective clothing and avoidance of tanning beds.    When the Photo Finder is ready, we will plan to send patient to see Dr. Benites for mole mapping.     2. Multiple benign nevi     No further intervention required at this time.     3. Right medial canthus/nasal sidewall, solar lentigo    We will continue to monitor this lesion. Report any changes    Follow-up in  1 year, plan for fotofinder when up and going.     Staff Involved:  Scribe/Staff    Scribe Disclosure  I, Raghu Berger, am serving as a scribe to document services personally performed by Dr. Marisela Juarez MD, based on data collection and the provider's statements to me.     Provider Disclosure:   The documentation recorded by the scribe accurately reflects the services I personally performed and the  decisions made by me.    Marisela Juarez MD    Department of Dermatology  [facility]: Phone: [phone], Fax:[phone]  [facility] [place]: Phone: [phone], Fax: [phone]                  Again, thank you for allowing me to participate in the care of your patient.        Sincerely,        Marisela Juarez MD

## 2018-12-11 NOTE — NURSING NOTE
Gatito Collier's goals for this visit include:   Chief Complaint   Patient presents with     RECHECK     Eric is returning for a recheck; several areas of concern today.       He requests these members of his care team be copied on today's visit information:     PCP: Schoen, Gregory G    Referring Provider:  ESTABLISHED PATIENT  No address on file    There were no vitals taken for this visit.    Do you need any medication refills at today's visit? No  Odilia Ramirez LPN

## 2019-05-21 DIAGNOSIS — Z13.6 CARDIOVASCULAR SCREENING; LDL GOAL LESS THAN 160: Primary | ICD-10-CM

## 2019-05-21 DIAGNOSIS — Z13.6 CARDIOVASCULAR SCREENING; LDL GOAL LESS THAN 160: ICD-10-CM

## 2019-05-21 DIAGNOSIS — S16.1XXD STRAIN OF NECK MUSCLE, SUBSEQUENT ENCOUNTER: ICD-10-CM

## 2019-05-21 LAB
CHOLEST SERPL-MCNC: 203 MG/DL
HDLC SERPL-MCNC: 56 MG/DL
LDLC SERPL CALC-MCNC: 121 MG/DL
NONHDLC SERPL-MCNC: 147 MG/DL
TRIGL SERPL-MCNC: 130 MG/DL

## 2019-05-21 PROCEDURE — 80061 LIPID PANEL: CPT | Performed by: FAMILY MEDICINE

## 2019-05-21 PROCEDURE — 36415 COLL VENOUS BLD VENIPUNCTURE: CPT | Performed by: FAMILY MEDICINE

## 2019-05-22 ENCOUNTER — HOSPITAL ENCOUNTER (OUTPATIENT)
Dept: PHYSICAL THERAPY | Facility: CLINIC | Age: 54
Setting detail: THERAPIES SERIES
End: 2019-05-22
Attending: FAMILY MEDICINE
Payer: COMMERCIAL

## 2019-05-22 DIAGNOSIS — S16.1XXD STRAIN OF NECK MUSCLE, SUBSEQUENT ENCOUNTER: ICD-10-CM

## 2019-05-22 PROCEDURE — 97161 PT EVAL LOW COMPLEX 20 MIN: CPT | Mod: GP

## 2019-05-22 PROCEDURE — 97110 THERAPEUTIC EXERCISES: CPT | Mod: GP

## 2019-05-22 NOTE — PROGRESS NOTES
05/22/19 1333   General Information   Type of Visit Initial OP Ortho PT Evaluation   Start of Care Date 05/22/19   Referring Physician Dr. Isiah Rodriguez MD   Patient/Family Goals Statement To eliminate his lower cervical pain   Orders Evaluate and Treat   Date of Order 05/22/19   Certification Required? No   Medical Diagnosis Strain of Neck Muscle   Surgical/Medical history reviewed Yes   Precautions/Limitations no known precautions/limitations   General Information Comments Patient presents with a muscular pain at approximatel C7-T1 for over 6 months. The muscular pain has progressively worsened where occasionally he will be asymptomatic and other times it will radiate laterally and encompass the entire right upper trap. Patient has no history of injury, trauma or method of eliminating the pain.       Present No   Body Part(s)   Body Part(s) Cervical Spine   Presentation and Etiology   Pertinent history of current problem (include personal factors and/or comorbidities that impact the POC) Patient is a 54 year old male with cervical pain around c7/t1 descibed as a muscle strain. The tender point now radiates laterally across the entire right upper trap. The patient has not found success with stretching, heat or chiropractic adjustments.   Impairments A. Pain;D. Decreased ROM;E. Decreased flexibility   Functional Limitations perform activities of daily living;perform desired leisure / sports activities;perform required work activities   Symptom Location Right upper/mid trap near C7/T1   How/Where did it occur From insidious onset   Onset date of current episode/exacerbation 08/01/18   Chronicity Chronic   Pain rating (0-10 point scale) Best (/10);Worst (/10)   Best (/10) 0   Worst (/10) 6   Pain quality C. Aching   Frequency of pain/symptoms B. Intermittent   Pain/symptoms are: Other   Pain symptoms comment Unable to determine   Pain/symptoms exacerbated by G. Certain positions    Pain/symptoms eased by E. Changing positions;F. Certain positions   Progression of symptoms since onset: Worsened   Prior Level of Function   Prior Level of Function-Mobility Ind   Prior Level of Function-ADLs Ind   Current Level of Function   Current Community Support Family/friend caregiver   Patient role/employment history A. Employed   Employment Comments Doctor   Living environment [facility]   Home/community accessibility No Concerns   Current equipment-Gait/Locomotion None   Current equipment-ADL None   Fall Risk Screen   Fall screen completed by PT   Have you fallen 2 or more times in the past year? No   Have you fallen and had an injury in the past year? No   Is patient a fall risk? No   Cervical Spine   Observation Unremarkable   Integumentary  Unremarkable   Posture Unremarkable   Cervical Flexion ROM WNL   Cervical Extension ROM WNL   Cervical Right Side Bending ROM WNL   Cervical Left Side Bending ROM WNL   Cervical Right Rotation ROM Slight limitation   Cervical Left Rotation ROM WNL   Shoulder AROM Screen Unremarkable   Shoulder Shrug (C2-C4) Strength 5/5   Shoulder Abd (C5) Strength 5/5   Shoulder Add (C7) Strength 5/5   Shoulder ER (C5, C6) Strength 5/5   Shoulder IR (C5, C6) Strength 5/5   Elbow Flexion (C5, C6) Strength 5/5   Elbow Extension (C7) Strength 5/5   Wrist Extension (C6) Strength 5/5   Wrist Flexion (C7) Strength 5/5   Thumb Abd (C8) Strength 5/5   5th Finger Add (T1) Strength 5/5   Planned Therapy Interventions   Planned Therapy Interventions joint mobilization;manual therapy;neuromuscular re-education;ROM;strengthening;stretching   Planned Therapy Interventions Comment Restoring mobility in cervical spine as well as soft tissue work to the tender/trigger point in his neck.    Planned Modality Interventions   Planned Modality Interventions Ultrasound;Traction;TENS;Hot packs;Electrical stimulation;Cryotherapy   Planned Modality Interventions Comments PRN only   Clinical Impression    Criteria for Skilled Therapeutic Interventions Met yes, treatment indicated   PT Diagnosis Right sided lower cervical pain   Influenced by the following impairments Patient has slight limitaitons in ROM and pain in the lower cervical neck   Functional limitations due to impairments Patient has pain when turning to look behind him making it difficult to drive safely.   Clinical Presentation Stable/Uncomplicated   Clinical Presentation Rationale Based on observation, evaluation and clinical reasoning.   Clinical Decision Making (Complexity) Low complexity   Therapy Frequency other (see comments)   Predicted Duration of Therapy Intervention (days/wks) 6 weeks   Risk & Benefits of therapy have been explained Yes   Patient, Family & other staff in agreement with plan of care Yes   Clinical Impression Comments Patient is a 54 year old male with cervical pain around c7/t1 descibed as a muscle strain. The tender point now radiates laterally across the entire right upper trap. The patient has not found success with stretching, heat or chiropractic adjustments.   ORTHO GOALS   PT Ortho Eval Goals 1   Ortho Goal 1   Goal Identifier NDI   Goal Description Patient will reduce score on the NDI to 2 or less   Target Date 07/10/19   Total Evaluation Time   PT Eval, Low Complexity Minutes (62378) 21

## 2019-06-04 ENCOUNTER — HOSPITAL ENCOUNTER (OUTPATIENT)
Dept: PHYSICAL THERAPY | Facility: CLINIC | Age: 54
Setting detail: THERAPIES SERIES
End: 2019-06-04
Attending: FAMILY MEDICINE
Payer: COMMERCIAL

## 2019-06-04 PROCEDURE — 97035 APP MDLTY 1+ULTRASOUND EA 15: CPT | Mod: GP

## 2019-06-04 NOTE — PROGRESS NOTES
Outpatient Physical Therapy Discharge Note     Patient: Gatito Collier  : 1965    Beginning/End Dates of Reporting Period:  2019 to 2019    Referring Provider: Dr. Greg Schoen, MD    Therapy Diagnosis: Lower Cervical Pain     Client Self Report: Patient reports his pain has nearly completely resolved with only a slight tender point in his right upper trap. Patient feels great relief with his HEP and no longer has neck/upper thoracic concerns.    Objective Measurements:  Objective Measure: NDI  Details: 0                                    Outcome Measures (most recent score):  NDI 0    Goals:  Goal Identifier NDI   Goal Description Patient will reduce score on the NDI to 2 or less   Target Date 07/10/19   Date Met  19   Progress: Progress this reporting period: Patient's pain has reduced significantly and now only experiences an occasionally mild soreness in a tender point in his upper trap. His mobility and function has improved well and no longer needs skilled PT.     Progress Toward Goals:   Progress this reporting period: Patient's pain has reduced significantly and now only experiences an occasionally mild soreness in a tender point in his upper trap. His mobility and function has improved well and no longer needs skilled PT.          Plan:  Discharge from therapy.    Discharge:    Reason for Discharge: Patient has met all goals.    Equipment Issued: None    Discharge Plan: Patient to continue home program.

## 2019-09-03 ENCOUNTER — OFFICE VISIT (OUTPATIENT)
Dept: DERMATOLOGY | Facility: CLINIC | Age: 54
End: 2019-09-03
Payer: COMMERCIAL

## 2019-09-03 DIAGNOSIS — D23.9 DERMATOFIBROMA: ICD-10-CM

## 2019-09-03 DIAGNOSIS — D48.5 NEOPLASM OF UNCERTAIN BEHAVIOR OF SKIN: ICD-10-CM

## 2019-09-03 DIAGNOSIS — Z86.018 HISTORY OF DYSPLASTIC NEVUS: Primary | ICD-10-CM

## 2019-09-03 PROCEDURE — 88305 TISSUE EXAM BY PATHOLOGIST: CPT | Mod: TC | Performed by: DERMATOLOGY

## 2019-09-03 PROCEDURE — 99213 OFFICE O/P EST LOW 20 MIN: CPT | Mod: 25 | Performed by: DERMATOLOGY

## 2019-09-03 PROCEDURE — 11102 TANGNTL BX SKIN SINGLE LES: CPT | Performed by: DERMATOLOGY

## 2019-09-03 PROCEDURE — 11103 TANGNTL BX SKIN EA SEP/ADDL: CPT | Performed by: DERMATOLOGY

## 2019-09-03 ASSESSMENT — PAIN SCALES - GENERAL: PAINLEVEL: NO PAIN (0)

## 2019-09-03 NOTE — NURSING NOTE
Gatito Collier's goals for this visit include:   Chief Complaint   Patient presents with     RECHECK     Left nasal wall     Spot Check     Right calf/ Right upper lip     He requests these members of his care team be copied on today's visit information:     PCP: Schoen, Gregory G    Referring Provider:  ESTABLISHED PATIENT  No address on file    There were no vitals taken for this visit.    Do you need any medication refills at today's visit? No    Odilia Ramirez LPN

## 2019-09-03 NOTE — NURSING NOTE
The following medication was given:     MEDICATION:  Lidocaine with epinephrine 1% [id number]  ROUTE: SQ  SITE: see procedure note  DOSE: see procedure note  LOT #: [id number]  : [employer]  EXPIRATION DATE: Dec 2019  NDC#: 6402-9211-15   Was there drug waste? Yes  Multi-dose vial: Yes    Edel Connell LPN  September 3, 2019

## 2019-09-03 NOTE — PROGRESS NOTES
[facility] Dermatology Note      Dermatology Problem List:  1. History of dysplastic nevus  -Compound nevus with mild dysplasia, left lower paraspinal, s/p biopsy 1/13/2017   -Junctional dysplastic nevus with mild atypia, left lower back, s/p biopsy, narrowly excised, 11/23/2015  -Compound dysplastic nevus with mild atypia, mid back, s/p biopsy 5/19/2015  -Compound dysplastic nevus with mild to moderate atypia, right lower back, s/p excised with biopsy 5/19/2015  -Compound dysplastic nevus with mild to moderate atypia, central abdomen, s/p biopsy 5/19/2015, s/p re-excision for recurrent pigment 2/2/2016  -Compound dysplastic nevus with severe atypia, left chest, s/p excision 1/13/2015  2. Lesion on left calf excised as a child, approximately age 4    Last FBSE:  9/3/19    Encounter Date: Sep 3, 2019    CC:  Chief Complaint   Patient presents with     RECHECK     Left nasal wall     Spot Check     Right calf/ Right upper lip         History of Present Illness:  Mr. Gatito Collier is a 54 year old male who a history of dysplastic nevi who presents for a spot check. He was last seen in dermatology on 12/11/18 at which time a lesion on the right nasal sidewall was flagged for further monitoring.     Today he would like spots on his left nasal wall, right calf, and right upper lip checked. He denies any bleeding, crusting or changing lesions. On his right calf, there is an area that he says he feels when putting on lotion that doesn't hurt or itch, but has a different texture from the surrounding areas. He has a spot on his right upper lip that he thinks he received after being hit in the mouth playing basketball several months ago. He thinks it could be a mucocele.    No other concerns.  Past Medical History:   Patient Active Problem List   Diagnosis     Allergic rhinitis     Benign neoplasm of skin     CARDIOVASCULAR SCREENING; LDL GOAL LESS THAN 160     History of dysplastic nevus     Past  Medical History:   Diagnosis Date     Allergic rhinitis, cause unspecified     Allergic rhinitis     Benign neoplasm of skin, site unspecified     dysplastic nevi     Calculus of gallbladder without mention of cholecystitis or obstruction     Cholelithiasis     Unspecified disease of pancreas ?    Pancreatitis/ Gall stone     Past Surgical History:   Procedure Laterality Date     COLONOSCOPY N/A 2017    Procedure: COMBINED COLONOSCOPY, SINGLE OR MULTIPLE BIOPSY/POLYPECTOMY BY BIOPSY;  colonoscopy. POlypectomy per forceps;  Surgeon: Michael Kunz MD;  Location: [facility]     HC EXC BENIGN SKIN LESION TRUNK/ARM/LEG 2.1-3.0 CM      birthmark removal, Left calf - approx age 4     HC REMOVE TONSILS/ADENOIDS,<11 Y/O      T & A <12y.o. 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   Kept in chart for convenience.     Family History:  Family History   Problem Relation Age of Onset     Hypertension Mother      Lipids Mother      C.A.D. Maternal Grandmother          of MI age 67     Cerebrovascular Disease Maternal Grandmother         first at age 35     Hypertension Maternal Grandmother      Heart Disease Maternal Grandmother         CHF     Cancer Maternal Grandfather         Pancreatic CA age 70/melanoma     Genitourinary Problems Brother         kidney stone     Anesthesia Reaction No family hx of      Melanoma No family hx of      Skin Cancer No family hx of      There is a family history of skin cancer in the patient's maternal grandfather.  Kept in chart for convenience.       Medications:  Current Outpatient Medications   Medication Sig Dispense Refill     loratadine (CLARITIN) 10 MG tablet Take 10 mg by mouth as needed        Multiple Vitamins-Minerals (CENTRUM SILVER) per tablet Take 1 tablet by mouth daily       Multiple Vitamins-Minerals (PRESERVISION AREDS 2) CAPS Take by mouth daily          Allergies   Allergen Reactions     No Known Drug Allergies        Review of Systems:  -Constitutional: Otherwise feeling well, in usual state of health.   -Skin: As above in HPI. No additional skin concerns.    Physical exam:  Vitals: There were no vitals taken for this visit.  GEN: This is a well developed, well-nourished male in no acute distress, in a pleasant mood.    SKIN:Total skin, which includes the head/face, both arms, chest, back, abdomen,both legs,  buttocks, digits and/or nails, was examined.  - Toribio Skin Type: II  - Multiple regular brown pigmented macules and papules are identified on the trunk and extremities.   - 3 mm grey macule on left forearm  - 3 mm pigmented macule on right lower leg  - There is a firm tan/flesh colored papule that dimples with lateral pressure on the right posterior calf.  - 3 mm violaceous papule on the right upper lip  - red macule on right medial canthus/nasal sidewall  -No other lesions of concern on areas examined.       Impression/Plan:  1. History of DN -    Recommend sunscreens SPF #30 or greater, protective clothing and avoidance of tanning beds.    2. Right medial canthus/nasal sidewall, solar lentigo vs AK    Photo taken at today's visit    We will continue to monitor this lesion. Report any changes.    3. NUB, 3 mm grey macule on left forearm, Ddx inflamed nevus    Shave biopsy:  After discussion of benefits and risks including but not limited to bleeding/bruising, pain/swelling, infection, scar, incomplete removal, nerve damage/numbness, recurrence, and non-diagnostic biopsy, written consent, verbal consent and photographs were obtained. Time-out was performed. The area was cleaned with isopropyl alcohol. 0.5mL of 1% lidocaine with epinephrine was injected to obtain adequate anesthesia of the lesion on the left forearm. A shave biopsy was performed. Hemostasis was achieved with aluminium chloride. Vaseline and a sterile dressing were applied. The  patient tolerated the procedure and no complications were noted. The patient was provided with verbal and written post care instructions.     4. NUB, 3 mm pigmented macule on right lower leg-Shave biopsy:  After discussion of benefits and risks including but not limited to bleeding/bruising, pain/swelling, infection, scar, incomplete removal, nerve damage/numbness, recurrence, and non-diagnostic biopsy, written consent, verbal consent and photographs were obtained. Time-out was performed. The area was cleaned with isopropyl alcohol. 0.5mL of 1% lidocaine with epinephrine was injected to obtain adequate anesthesia of the lesion on the right lower leg. A shave biopsy was performed. Hemostasis was achieved with aluminium chloride. Vaseline and a sterile dressing were applied. The patient tolerated the procedure and no complications were noted. The patient was provided with verbal and written post care instructions.     5. Dermatofibroma, right posterior calf    No further intervention needed.    6. 3 mm violaceous papule on the right upper lip- venous lake or other, will monitor    We will continue to monitor this lesion. Report any changes      Follow-up within 4 months with Dr. Benites for spot check, next skin exam in 1 year with Dr. Benites., earlier for new or changing lesions.     Staff Involved:  Scribe/Staff    Scribe Disclosure  I, Hanna Jansen, am serving as a scribe to document services personally performed by Dr. Marisela Juarez MD, based on data collection and the provider's statements to me.      Provider Disclosure:   The documentation recorded by the scribe accurately reflects the services I personally performed and the decisions made by me.    Marisela Juarez MD    Department of Dermatology  [facility]: Phone: [phone], Fax:[phone]  [facility]: Phone: [phone], Fax:  [phone]

## 2019-09-03 NOTE — LETTER
"    Patient:  Love Weinstein  :   1965  MRN:     [id number]        Mr.Theodore DIANA Weinstein  [address] [address]        2019    Dear Love Weinstein,    We are writing to inform you of your test results that show a mildly atypical mole that can be rechecked in 1 year. The lesion on the leg was normal. No treatments are needed at this time.     Thank you for taking the time to be seen in our dermatology clinic. If you have further questions or concerns, please contact the clinic(see phone number listed below).      Sincerely,    Marisela Juarez MD    Department of Dermatology  [facility]: Phone: [phone], Fax:[phone]  [facility]: Phone: [phone], Fax: [phone]    Resulted Orders   Dermatological path order and indications   Result Value Ref Range    Copath Report       Patient Name: LOVE WEINSTEIN  MR#: [id number]  Specimen #: [id number]  Collected: 9/3/2019  Received: 9/3/2019  Reported: 2019 20:52  Ordering Phy(s): MARISELA JUAREZ    For improved result formatting, select 'View Enhanced Report Format' under   Linked Documents section.    SPECIMEN(S):  A: Shave, left forearm  B: Shave, right lower leg    FINAL DIAGNOSIS:  A. Shave, left forearm:  - Lentiginous compound melanocytic nevus with mild atypia - (see   description)    B. Shave, right lower leg:  - Lentiginous compound melanocytic nevus - (see description)    I have personally reviewed all specimens and/or slides, including the   listed special stains, and used them  with my medical judgement to determine or confirm the final diagnosis.    Electronically signed out by:    Irvin Finley M.D., [facility]    CLINICAL HISTORY:  The patient is a 54-year-old male.    GROSS:  A:  The specimen is received in formalin with proper patient   identification, labeled \"L forearm\".  " "The  specimen  consists of a 0.5 x 0.3 cm skin shave which displays a 0.3 x 0.3   cm tan-brown, ill-defined lesion.  The resection margin is inked blue and the specimen is bisected and   submitted in A1.    B:  The specimen is received in formalin with proper patient   identification, labeled \"R lower leg\".  The  specimen consists of a 0.7 x 0.5 cm skin shave which displays a 0.3 x 0.3   cm tan-brown, flat lesion. The  resection margin is inked blue and the specimen is bisected and submitted   in B1. (Dictated by: LOGAN Muir 9/4/2019 09:35 AM)    MICROSCOPIC:  A. The specimen exhibits a compound melanocytic proliferation with an   apparent narrow lateral diameter, which  is predominantly single celled but occasionally nested at the junction,   with mild cytologic atypia and  architectural disorder but cells mostly confined to the sides and bases of   rete ridges, above nests of  papillary dermal melanocytes which mature with descent.  The lesion   appears narrowly excised but is close to  t he deep margin.    B. The specimen exhibits a compound melanocytic proliferation with a   narrow lateral diameter, which is  predominantly single celled but occasionally nested at the junction, above   nests and cords of melanocytes  which mature with descent in the papillary dermis, with scattered   melanophages.  The lesion appears to be  completely excised.    The technical component of this testing was completed at the [facility], with the professional component performed   at the [facility], [address] [place] [phone] ([phone])    CPT Codes:  A: 99638-AX3.P, 88182-OH9.T  B: 52901-OF7.P, 93660-GO8.T    COLLECTION SITE:  Client: [facility]  Location: [facility] (B)                         "

## 2019-09-03 NOTE — PATIENT INSTRUCTIONS
[phone]  ---  Please, see Dr. Benites for your photo consultation. Dr. Benites will decide if you qualify. You will not have photos the same day. All mole mapping is done only a the [facility] site.   ---  Wound Care After a Biopsy    What is a skin biopsy?  A skin biopsy allows the doctor to examine a very small piece of tissue under the microscope to determine the diagnosis and the best treatment for the skin condition. A local anesthetic (numbing medicine)  is injected with a very small needle into the skin area to be tested. A small piece of skin is taken from the area. Sometimes a suture (stitch) is used.     What are the risks of a skin biopsy?  I will experience scar, bleeding, swelling, pain, crusting and redness. I may experience incomplete removal or recurrence. Risks of this procedure are excessive bleeding, bruising, infection, nerve damage, numbness, thick (hypertrophic or keloidal) scar and non-diagnostic biopsy.    How should I care for my wound for the first 24 hours?    Keep the wound dry and covered for 24 hours    If it bleeds, hold direct pressure on the area for 15 minutes. If bleeding does not stop then go to the emergency room    Avoid strenuous exercise the first 1-2 days or as your doctor instructs you    How should I care for the wound after 24 hours?    After 24 hours, remove the bandage    You may bathe or shower as normal    If you had a scalp biopsy, you can shampoo as usual and can use shower water to clean the biopsy site daily    Clean the wound twice a day with gentle soap and water    Do not scrub, be gentle    Apply white petroleum/Vaseline after cleaning the wound with a cotton swab or a clean finger, and keep the site covered with a Bandaid /bandage. Bandages are not necessary with a scalp biopsy    If you are unable to cover the site with a Bandaid /bandage, re-apply ointment 2-3 times a day to keep the site moist. Moisture will help with healing    Avoid strenuous  activity for first 1-2 days    Avoid lakes, rivers, pools, and oceans until the stitches are removed or the site is healed    How do I clean my wound?    Wash hands thoroughly with soap or use hand  before all wound care    Clean the wound with gentle soap and water    Apply white petroleum/Vaseline  to wound after it is clean    Replace the Bandaid /bandage to keep the wound covered for the first few days or as instructed by your doctor    If you had a scalp biopsy, warm shower water to the area on a daily basis should suffice    What should I use to clean my wound?     Cotton-tipped applicators (Qtips )    White petroleum jelly (Vaseline ). Use a clean new container and use Q-tips to apply.    Bandaids   as needed    Gentle soap     How should I care for my wound long term?    Do not get your wound dirty    Keep up with wound care for one week or until the area is healed.    A small scab will form and fall off by itself when the area is completely healed. The area will be red and will become pink in color as it heals. Sun protection is very important for how your scar will turn out. Sunscreen with an SPF 30 or greater is recommended once the area is healed.    You should have some soreness but it should be mild and slowly go away over several days. Talk to your doctor about using tylenol for pain,    When should I call my doctor?  If you have increased:     Pain or swelling    Pus or drainage (clear or slightly yellow drainage is ok)    Temperature over 100F    Spreading redness or warmth around wound    When will I hear about my results?  The biopsy results can take 2-3 weeks to come back. The clinic will call you with the results, send you a [employer] message, or have you schedule a follow-up clinic or phone time to discuss the results. Contact our clinics if you do not hear from us in 3 weeks.     Who should I call with questions?    [facility]: [phone]      [facility]: [phone]    For urgent needs outside of business hours call the [facility] at [phone] and ask for the dermatology resident on call

## 2019-09-03 NOTE — LETTER
9/3/2019         RE: Gatito Collier  [address] [address]        Dear Colleague,    Thank you for referring your patient, Gatito Collier, to the [facility]. Please see a copy of my visit note below.    [facility] Dermatology Note      Dermatology Problem List:  1. History of dysplastic nevus  -Compound nevus with mild dysplasia, left lower paraspinal, s/p biopsy 1/13/2017   -Junctional dysplastic nevus with mild atypia, left lower back, s/p biopsy, narrowly excised, 11/23/2015  -Compound dysplastic nevus with mild atypia, mid back, s/p biopsy 5/19/2015  -Compound dysplastic nevus with mild to moderate atypia, right lower back, s/p excised with biopsy 5/19/2015  -Compound dysplastic nevus with mild to moderate atypia, central abdomen, s/p biopsy 5/19/2015, s/p re-excision for recurrent pigment 2/2/2016  -Compound dysplastic nevus with severe atypia, left chest, s/p excision 1/13/2015  2. Lesion on left calf excised as a child, approximately age 4    Last FBSE:  9/3/19    Encounter Date: Sep 3, 2019    CC:  Chief Complaint   Patient presents with     RECHECK     Left nasal wall     Spot Check     Right calf/ Right upper lip         History of Present Illness:  Mr. Gatito Collier is a 54 year old male who a history of dysplastic nevi who presents for a spot check. He was last seen in dermatology on 12/11/18 at which time a lesion on the right nasal sidewall was flagged for further monitoring.     Today he would like spots on his left nasal wall, right calf, and right upper lip checked. He denies any bleeding, crusting or changing lesions. On his right calf, there is an area that he says he feels when putting on lotion that doesn't hurt or itch, but has a different texture from the surrounding areas. He has a spot on his right upper lip that he thinks he received after being hit in the mouth playing basketball several months ago. He thinks  it could be a mucocele.    No other concerns.  Past Medical History:   Patient Active Problem List   Diagnosis     Allergic rhinitis     Benign neoplasm of skin     CARDIOVASCULAR SCREENING; LDL GOAL LESS THAN 160     History of dysplastic nevus     Past Medical History:   Diagnosis Date     Allergic rhinitis, cause unspecified     Allergic rhinitis     Benign neoplasm of skin, site unspecified     dysplastic nevi     Calculus of gallbladder without mention of cholecystitis or obstruction     Cholelithiasis     Unspecified disease of pancreas ?    Pancreatitis/ Gall stone     Past Surgical History:   Procedure Laterality Date     COLONOSCOPY N/A 2017    Procedure: COMBINED COLONOSCOPY, SINGLE OR MULTIPLE BIOPSY/POLYPECTOMY BY BIOPSY;  colonoscopy. POlypectomy per forceps;  Surgeon: Michael Kunz MD;  Location: [facility]     HC EXC BENIGN SKIN LESION TRUNK/ARM/LEG 2.1-3.0 CM      birthmark removal, Left calf - approx age 4     HC REMOVE TONSILS/ADENOIDS,<11 Y/O      T & A <12y.o. 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   Kept in chart for convenience.     Family History:  Family History   Problem Relation Age of Onset     Hypertension Mother      Lipids Mother      C.A.D. Maternal Grandmother          of MI age 67     Cerebrovascular Disease Maternal Grandmother         first at age 35     Hypertension Maternal Grandmother      Heart Disease Maternal Grandmother         CHF     Cancer Maternal Grandfather         Pancreatic CA age 70/melanoma     Genitourinary Problems Brother         kidney stone     Anesthesia Reaction No family hx of      Melanoma No family hx of      Skin Cancer No family hx of      There is a family history of skin cancer in the patient's maternal grandfather.  Kept in chart for convenience.       Medications:  Current Outpatient Medications   Medication Sig Dispense Refill      loratadine (CLARITIN) 10 MG tablet Take 10 mg by mouth as needed        Multiple Vitamins-Minerals (CENTRUM SILVER) per tablet Take 1 tablet by mouth daily       Multiple Vitamins-Minerals (PRESERVISION AREDS 2) CAPS Take by mouth daily         Allergies   Allergen Reactions     No Known Drug Allergies        Review of Systems:  -Constitutional: Otherwise feeling well, in usual state of health.   -Skin: As above in HPI. No additional skin concerns.    Physical exam:  Vitals: There were no vitals taken for this visit.  GEN: This is a well developed, well-nourished male in no acute distress, in a pleasant mood.    SKIN:Total skin, which includes the head/face, both arms, chest, back, abdomen,both legs,  buttocks, digits and/or nails, was examined.  - Toribio Skin Type: II  - Multiple regular brown pigmented macules and papules are identified on the trunk and extremities.   - 3 mm grey macule on left forearm  - 3 mm pigmented macule on right lower leg  - There is a firm tan/flesh colored papule that dimples with lateral pressure on the right posterior calf.  - 3 mm violaceous papule on the right upper lip  - red macule on right medial canthus/nasal sidewall  -No other lesions of concern on areas examined.       Impression/Plan:  1. History of DN -    Recommend sunscreens SPF #30 or greater, protective clothing and avoidance of tanning beds.    2. Right medial canthus/nasal sidewall, solar lentigo vs AK    Photo taken at today's visit    We will continue to monitor this lesion. Report any changes.    3. NUB, 3 mm grey macule on left forearm, Ddx inflamed nevus    Shave biopsy:  After discussion of benefits and risks including but not limited to bleeding/bruising, pain/swelling, infection, scar, incomplete removal, nerve damage/numbness, recurrence, and non-diagnostic biopsy, written consent, verbal consent and photographs were obtained. Time-out was performed. The area was cleaned with isopropyl alcohol. 0.5mL  of 1% lidocaine with epinephrine was injected to obtain adequate anesthesia of the lesion on the left forearm. A shave biopsy was performed. Hemostasis was achieved with aluminium chloride. Vaseline and a sterile dressing were applied. The patient tolerated the procedure and no complications were noted. The patient was provided with verbal and written post care instructions.     4. NUB, 3 mm pigmented macule on right lower leg-Shave biopsy:  After discussion of benefits and risks including but not limited to bleeding/bruising, pain/swelling, infection, scar, incomplete removal, nerve damage/numbness, recurrence, and non-diagnostic biopsy, written consent, verbal consent and photographs were obtained. Time-out was performed. The area was cleaned with isopropyl alcohol. 0.5mL of 1% lidocaine with epinephrine was injected to obtain adequate anesthesia of the lesion on the right lower leg. A shave biopsy was performed. Hemostasis was achieved with aluminium chloride. Vaseline and a sterile dressing were applied. The patient tolerated the procedure and no complications were noted. The patient was provided with verbal and written post care instructions.     5. Dermatofibroma, right posterior calf    No further intervention needed.    6. 3 mm violaceous papule on the right upper lip- venous lake or other, will monitor    We will continue to monitor this lesion. Report any changes      Follow-up within 4 months with Dr. Benites for spot check, next skin exam in 1 year with Dr. Benites., earlier for new or changing lesions.     Staff Involved:  Scribe/Staff    Scribe Disclosure  I, Hanna Jansen, am serving as a scribe to document services personally performed by Dr. Marisela Juarez MD, based on data collection and the provider's statements to me.      Provider Disclosure:   The documentation recorded by the scribe accurately reflects the services I personally performed and the decisions made by me.    Mairsela Juarez,  MD    Department of Dermatology  [facility]: Phone: [phone], Fax:[phone]  [facility] [place]: Phone: [phone], Fax: [phone]                      Again, thank you for allowing me to participate in the care of your patient.        Sincerely,        Marisela Juarez MD

## 2019-09-07 LAB — COPATH REPORT: NORMAL

## 2020-01-02 NOTE — PROGRESS NOTES
[facility] Dermatology Note      Dermatology Problem List:  1.History of Dysplastic Nevus  - Bx 9/3/19, L Forearm - Mild Atypia  - Bx 1/13/17, L Paraspinal Back - Compound nevus with mild dysplasia  - Bx 1/23/15, L Lower back - Junctional dysplastic nevus with mild atypia - narrowly excised  - Bx 5/19/15, Mid Back - Compound dysplastic nevus with mild atypia  - Bx 5/19/15, R Lower Back - Compound dysplastic nevus with mild to moderate atypia  - Bx 5/19/15, Central Abdomen - Compound dysplastic nevus with mild to moderate atypia - Exc. for recurrent pigment 2/2/2016  - Bx 10/21/14, Left Chest - Compound dysplastic nevus with severe atypia - Exc. 1/13/2015  2. Lesion on left calf excised as a child, approximately age 4  3. AK left nasal tip s/p cryo 1/9/2020       Encounter Date: Jan 9, 2020    CC:  Chief Complaint   Patient presents with     RECHECK     right nasal sidewall, right upper lip. Discuss mole mapping. No personal hx SC, maternal grandfather hx unknown SC.          History of Present Illness:  Mr. Gatito Collier is a 54 year old male who with a history of dysplastic nevus who presents for a spot check. He had lesion monitored by Dr. Juarez in September 2019 from the left nasal tip (note: notes state right medial canthus but pictures are left nasal tip). He is here to get these reevaluated today. He also wants to discuss mole mapping given his history of numerous moles and dysplastic nevi. No personal history of skin cancer.     Past Medical History:   Patient Active Problem List   Diagnosis     Allergic rhinitis     Benign neoplasm of skin     CARDIOVASCULAR SCREENING; LDL GOAL LESS THAN 160     History of dysplastic nevus     Past Medical History:   Diagnosis Date     Allergic rhinitis, cause unspecified     Allergic rhinitis     Benign neoplasm of skin, site unspecified     dysplastic nevi     Calculus of gallbladder without mention of cholecystitis or obstruction      Cholelithiasis     Unspecified disease of pancreas 2/00?    Pancreatitis/ Gall stone     Past Surgical History:   Procedure Laterality Date     COLONOSCOPY N/A 12/1/2017    Procedure: COMBINED COLONOSCOPY, SINGLE OR MULTIPLE BIOPSY/POLYPECTOMY BY BIOPSY;  colonoscopy. POlypectomy per forceps;  Surgeon: Michael Kunz MD;  Location: [facility]     HC EXC BENIGN SKIN LESION TRUNK/ARM/LEG 2.1-3.0 CM      birthmark removal, Left calf - approx age 4     HC REMOVE TONSILS/ADENOIDS,<13 Y/O      T & A <12y.o. (approx age 3)       Social History:  Patient is a physician at [employer].    Family History:  No family history of melanoma or NMSC.     Medications:  Current Outpatient Medications   Medication Sig Dispense Refill     Multiple Vitamins-Minerals (CENTRUM SILVER) per tablet Take 1 tablet by mouth daily       Multiple Vitamins-Minerals (PRESERVISION AREDS 2) CAPS Take by mouth daily       loratadine (CLARITIN) 10 MG tablet Take 10 mg by mouth as needed          Allergies   Allergen Reactions     No Known Drug Allergies        Review of Systems:  -Constitutional: Otherwise feeling well today, in usual state of health.  -Skin: As above in HPI. No additional skin concerns.    Physical exam:  Vitals: There were no vitals taken for this visit.  GEN: This is a well developed, well-nourished male in no acute distress, in a pleasant mood.    SKIN: Focused examination of the face, neck, trunk, hands was performed.  -Toribio skin type: II  -Venous lake on right upper cutaneous lip  -There is an erythematous macule with overyling adherent scale on the left nasal tip, on dermocscopy there are some rosettes and a strawberry appearance in the background  -Numerous slightly atypical nevi scattered on the back and chest. None with severe enough atypia to warrant biopsy today.  -No other lesions of concern on areas examined.       Impression/Plan:  1. Actinic keratosis, left nasal tip  - Cryotherapy procedure note: After  verbal consent and discussion of risks and benefits including but no limited to dyspigmentation/scar, blister, and pain, 1was(were) treated with 1-2mm freeze border for 2 cycles with liquid nitrogen. Post cryotherapy instructions were provided.     2. Venous lake right upper lip  - benign  - pt to monitor    3. History of dysplastic nevus with >100 nevi, some with fuzzy borders and slight clinical atypia  - Patient would be a good candidate for total body photography. Discussed what this would entail and the potential out of pocket expense. Pt would like to pursue. Will have our schedulers set up a skin check in 6 months with total body photography before that.     Follow-up if nasal tip AK is not healed after upcoming trip, otherwise in 6 months as above.      Staff Involved:  Scribe/Staff      Scribe Disclosure:   I, Eric Kidd, am serving as a scribe to document services personally performed by Dr. Marisela Juarez, based on data collection and the provider's statements to me.     Provider Disclosure:   The documentation recorded by the scribe accurately reflects the services I personally performed and the decisions made by me.    Latosha Benites MD    Department of Dermatology  [facility]: Phone: [phone], Fax:[phone]  [facility]: Phone: [phone], Fax: [phone]

## 2020-01-09 ENCOUNTER — OFFICE VISIT (OUTPATIENT)
Dept: DERMATOLOGY | Facility: CLINIC | Age: 55
End: 2020-01-09
Payer: COMMERCIAL

## 2020-01-09 DIAGNOSIS — R23.8 VENOUS LAKE OF LIP: ICD-10-CM

## 2020-01-09 DIAGNOSIS — Z86.018 HISTORY OF DYSPLASTIC NEVUS: ICD-10-CM

## 2020-01-09 DIAGNOSIS — L57.0 ACTINIC KERATOSIS: Primary | ICD-10-CM

## 2020-01-09 PROCEDURE — 99213 OFFICE O/P EST LOW 20 MIN: CPT | Mod: 25 | Performed by: DERMATOLOGY

## 2020-01-09 PROCEDURE — 17000 DESTRUCT PREMALG LESION: CPT | Performed by: DERMATOLOGY

## 2020-01-09 ASSESSMENT — PAIN SCALES - GENERAL: PAINLEVEL: NO PAIN (0)

## 2020-01-09 NOTE — LETTER
1/9/2020         RE: Gatito Collier  [address] [facility] [address]        Dear Colleague,    Thank you for referring your patient, Gatito Collier, to the [facility]. Please see a copy of my visit note below.    [facility] Dermatology Note      Dermatology Problem List:  1.History of Dysplastic Nevus  - Bx 9/3/19, L Forearm - Mild Atypia  - Bx 1/13/17, L Paraspinal Back - Compound nevus with mild dysplasia  - Bx 1/23/15, L Lower back - Junctional dysplastic nevus with mild atypia - narrowly excised  - Bx 5/19/15, Mid Back - Compound dysplastic nevus with mild atypia  - Bx 5/19/15, R Lower Back - Compound dysplastic nevus with mild to moderate atypia  - Bx 5/19/15, Central Abdomen - Compound dysplastic nevus with mild to moderate atypia - Exc. for recurrent pigment 2/2/2016  - Bx 10/21/14, Left Chest - Compound dysplastic nevus with severe atypia - Exc. 1/13/2015  2. Lesion on left calf excised as a child, approximately age 4  3. AK left nasal tip s/p cryo 1/9/2020       Encounter Date: Jan 9, 2020    CC:  Chief Complaint   Patient presents with     RECHECK     right nasal sidewall, right upper lip. Discuss mole mapping. No personal hx SC, maternal grandfather hx unknown SC.          History of Present Illness:  Mr. Gatito Collier is a 54 year old male who with a history of dysplastic nevus who presents for a spot check. He had lesion monitored by Dr. Juarez in September 2019 from the left nasal tip (note: notes state right medial canthus but pictures are left nasal tip). He is here to get these reevaluated today. He also wants to discuss mole mapping given his history of numerous moles and dysplastic nevi. No personal history of skin cancer.     Past Medical History:   Patient Active Problem List   Diagnosis     Allergic rhinitis     Benign neoplasm of skin     CARDIOVASCULAR SCREENING; LDL GOAL LESS THAN 160     History of dysplastic nevus      Past Medical History:   Diagnosis Date     Allergic rhinitis, cause unspecified     Allergic rhinitis     Benign neoplasm of skin, site unspecified     dysplastic nevi     Calculus of gallbladder without mention of cholecystitis or obstruction     Cholelithiasis     Unspecified disease of pancreas 2/00?    Pancreatitis/ Gall stone     Past Surgical History:   Procedure Laterality Date     COLONOSCOPY N/A 12/1/2017    Procedure: COMBINED COLONOSCOPY, SINGLE OR MULTIPLE BIOPSY/POLYPECTOMY BY BIOPSY;  colonoscopy. POlypectomy per forceps;  Surgeon: Michael Kunz MD;  Location: [facility]     HC EXC BENIGN SKIN LESION TRUNK/ARM/LEG 2.1-3.0 CM      birthmark removal, Left calf - approx age 4     HC REMOVE TONSILS/ADENOIDS,<13 Y/O      T & A <12y.o. (approx age 3)       Social History:  Patient is a physician at [employer].    Family History:  No family history of melanoma or NMSC.     Medications:  Current Outpatient Medications   Medication Sig Dispense Refill     Multiple Vitamins-Minerals (CENTRUM SILVER) per tablet Take 1 tablet by mouth daily       Multiple Vitamins-Minerals (PRESERVISION AREDS 2) CAPS Take by mouth daily       loratadine (CLARITIN) 10 MG tablet Take 10 mg by mouth as needed          Allergies   Allergen Reactions     No Known Drug Allergies        Review of Systems:  -Constitutional: Otherwise feeling well today, in usual state of health.  -Skin: As above in HPI. No additional skin concerns.    Physical exam:  Vitals: There were no vitals taken for this visit.  GEN: This is a well developed, well-nourished male in no acute distress, in a pleasant mood.    SKIN: Focused examination of the face, neck, trunk, hands was performed.  -Toribio skin type: II  -Venous lake on right upper cutaneous lip  -There is an erythematous macule with overyling adherent scale on the left nasal tip, on dermocscopy there are some rosettes and a strawberry appearance in the background  -Numerous slightly  atypical nevi scattered on the back and chest. None with severe enough atypia to warrant biopsy today.  -No other lesions of concern on areas examined.       Impression/Plan:  1. Actinic keratosis, left nasal tip  - Cryotherapy procedure note: After verbal consent and discussion of risks and benefits including but no limited to dyspigmentation/scar, blister, and pain, 1was(were) treated with 1-2mm freeze border for 2 cycles with liquid nitrogen. Post cryotherapy instructions were provided.     2. Venous lake right upper lip  - benign  - pt to monitor    3. History of dysplastic nevus with >100 nevi, some with fuzzy borders and slight clinical atypia  - Patient would be a good candidate for total body photography. Discussed what this would entail and the potential out of pocket expense. Pt would like to pursue. Will have our schedulers set up a skin check in 6 months with total body photography before that.     Follow-up if nasal tip AK is not healed after upcoming trip, otherwise in 6 months as above.      Staff Involved:  Scribe/Staff      Scribe Disclosure:   I, Eric Kidd, am serving as a scribe to document services personally performed by Dr. Marisela Juarez, based on data collection and the provider's statements to me.     Provider Disclosure:   The documentation recorded by the scribe accurately reflects the services I personally performed and the decisions made by me.    Latosha Benites MD    Department of Dermatology  [facility]: Phone: [phone], Fax:[phone]  [facility]: Phone: [phone], Fax: [phone]            Again, thank you for allowing me to participate in the care of your patient.        Sincerely,        Latosha Benites MD

## 2020-01-09 NOTE — NURSING NOTE
Gatito Collier's goals for this visit include:   Chief Complaint   Patient presents with     RECHECK     right nasal sidewall, right upper lip. Discuss mole mapping. No personal hx SC, maternal grandfather hx unknown SC.        He requests these members of his care team be copied on today's visit information:   no    PCP: Schoen, Gregory G    Referring Provider:  No referring provider defined for this encounter.    There were no vitals taken for this visit.    Do you need any medication refills at today's visit? No    Clarissa Kearney LPN

## 2020-01-09 NOTE — PATIENT INSTRUCTIONS
Cryotherapy    What is it?    Use of a very cold liquid, such as liquid nitrogen, to freeze and destroy abnormal skin cells that need to be removed    What should I expect?    Tenderness and redness    A small blister that might grow and fill with dark purple blood. There may be crusting.    More than one treatment may be needed if the lesions do not go away.    How do I care for the treated area?    Gently wash the area with your hands when bathing.    Use a thin layer of Vaseline to help with healing. You may use a Band-Aid.     The area should heal within 7-10 days and may leave behind a pink or lighter color.     Do not use an antibiotic or Neosporin ointment.     You may take acetaminophen (Tylenol) for pain.     Call your Doctor if you have:    Severe pain    Signs of infection (warmth, redness, cloudy yellow drainage, and or a bad smell)    Questions or concerns    Who should I call with questions?       [facility]: [phone]       [facility]: [phone]       For urgent needs outside of business hours call the [facility] at [phone]        and ask for the dermatology resident on call

## 2020-01-20 ENCOUNTER — MYC MEDICAL ADVICE (OUTPATIENT)
Dept: DERMATOLOGY | Facility: CLINIC | Age: 55
End: 2020-01-20

## 2020-01-20 NOTE — TELEPHONE ENCOUNTER
This is from LOV:     3. History of dysplastic nevus with >100 nevi, some with fuzzy borders and slight clinical atypia  - Patient would be a good candidate for total body photography. Discussed what this would entail and the potential out of pocket expense. Pt would like to pursue. Will have our schedulers set up a skin check in 6 months with total body photography before that.      Do you need us to send message to [facility] to schedule for mole mapping? Ok to schedule 6 months here for skin check?     Thanks,     Loree

## 2020-01-20 NOTE — TELEPHONE ENCOUNTER
Molly, can you help schedule him in pigmented lesions clinic/onc clinic?    Thanks,    Latosha Benites MD    Department of Dermatology  [facility]: Phone: [phone], Fax:[phone]  [facility]: Phone: [phone], Fax: [phone]

## 2020-01-21 ENCOUNTER — MYC MEDICAL ADVICE (OUTPATIENT)
Dept: DERMATOLOGY | Facility: CLINIC | Age: 55
End: 2020-01-21

## 2020-01-21 NOTE — TELEPHONE ENCOUNTER
Patient is Scheduled for Pigmented Lesion Visit/Skin Check and Total Body Photography on 7/24/20.    Molly Condon LPN

## 2020-08-20 RX ORDER — LIDOCAINE HYDROCHLORIDE AND EPINEPHRINE 10; 10 MG/ML; UG/ML
1.5 INJECTION, SOLUTION INFILTRATION; PERINEURAL ONCE
Status: DISCONTINUED | OUTPATIENT
Start: 2020-08-21 | End: 2020-08-21

## 2020-08-20 NOTE — PROGRESS NOTES
[facility] Dermatology Note      Dermatology Problem List:  PLC/Melanoma Clinic Patient  Plan for TBP  1.History of Dysplastic Nevi  - L Forearm - Mild Atypia, Bx 9/3/19   - L Paraspinal Back - Compound nevus with mild dysplasia, Bx 1/13/17  - Mid Back - Compound dysplastic nevus with mild atypia, Bx 5/19/15  - R Lower Back - Compound dysplastic nevus with mild to moderate atypia, Bx 5/19/15  - Central Abdomen - Compound dysplastic nevus with mild to moderate atypia, Bx 5/19/15, Exc. for recurrent pigment 2/2/2016  - L Lower back - Junctional dysplastic nevus with mild atypia - narrowly excised, Bx 1/23/15  - Left Chest - Compound dysplastic nevus with severe atypia, Bx 10/21/14, Exc. 1/13/2015  2. Lesion on left calf excised as a child, approximately age 4  3. AK   -left nasal tip s/p cryo 1/9/2020, 8/21/20       Encounter Date: Aug 21, 2020    CC:  Chief Complaint   Patient presents with     Oncology Clinic Visit     Skin check- Hx of Dysplastic Nevi         History of Present Illness:  Mr. Gatito Collier is a 55 year old male who with a history of dysplastic nevus who presents for a skin check. He was last seen on 1/9/20 for a spot check when an AK was treated with cryo. Today, he notes that the spot on the nasal tip did not completely resolve. It has seemed red lately. No pain or bleeding. He notes there may be another similar spot on the right side of the nose. No symptom here either but he feels scale. No other specific skin concerns. No tender, nonhealing, or bleeding skin lesions.     Past Medical History:   Patient Active Problem List   Diagnosis     Allergic rhinitis     Benign neoplasm of skin     CARDIOVASCULAR SCREENING; LDL GOAL LESS THAN 160     History of dysplastic nevus     Past Medical History:   Diagnosis Date     Allergic rhinitis, cause unspecified     Allergic rhinitis     Benign neoplasm of skin, site unspecified     dysplastic nevi     Calculus of gallbladder without  mention of cholecystitis or obstruction     Cholelithiasis     Unspecified disease of pancreas 2/00?    Pancreatitis/ Gall stone     Past Surgical History:   Procedure Laterality Date     COLONOSCOPY N/A 12/1/2017    Procedure: COMBINED COLONOSCOPY, SINGLE OR MULTIPLE BIOPSY/POLYPECTOMY BY BIOPSY;  colonoscopy. POlypectomy per forceps;  Surgeon: Michael Kunz MD;  Location: [facility]     HC EXC BENIGN SKIN LESION TRUNK/ARM/LEG 2.1-3.0 CM      birthmark removal, Left calf - approx age 4     HC REMOVE TONSILS/ADENOIDS,<11 Y/O      T & A <12y.o. 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     Family History:  No family history of melanoma or NMSC.   Reviewed and left in chart for clinician convenience.     Medications:  Current Outpatient Medications   Medication Sig Dispense Refill     fluticasone (FLONASE) 50 MCG/ACT nasal spray Spray 1 spray into both nostrils daily       loratadine (CLARITIN) 10 MG tablet Take 10 mg by mouth as needed        Multiple Vitamins-Minerals (CENTRUM SILVER) per tablet Take 1 tablet by mouth daily       Multiple Vitamins-Minerals (PRESERVISION AREDS 2) CAPS Take by mouth daily         Allergies   Allergen Reactions     No Known Drug Allergies        Review of Systems:  -Constitutional: Otherwise feeling well today, in usual state of health.  -Skin: As above in HPI. No additional skin concerns.    Physical exam:  Vitals: /78 (BP Location: Right arm, Patient Position: Sitting, Cuff Size: Adult Regular)   Pulse 60   Temp 98.1  F (36.7  C) (Oral)   Resp 16   Wt 86.8 kg (191 lb 6.4 oz)   SpO2 100%   GEN: This is a well developed, well-nourished male in no acute distress, in a pleasant mood.    SKIN: Total skin excluding the undergarment areas was performed. The exam included the head/face, neck, both arms, chest, back, abdomen, both legs, digits and/or nails. Declined genital exam.  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 Mildly tanned today.  -There is an erythematous macule with overyling adherent scale on the left nasal tip and right nasal side wall.  -Pink dome shaped papules on the trunk.  -Brown waxy papule with milia on the left temple.  -Scattered brown macules and papules on the body. All are less than 6mm in size and uniform in pigmentation. On the back, nevi are slightly larger in size than on the extremities, are light brown, and have the pink background and brown checkboard reticular network seen in congenital nevi. Two congenital nevi without significant atypia on the anterior pelvis. Around 100 nevi in all.   -Brown papule that dimples with lateral pressure on the right calf.  -No other lesions of concern on areas examined.       Impression/Plan:  1. Sun damaged skin with solar lentigines and poikiloderma of chivatte.  - Recommend sunscreens SPF #30 or greater, protective clothing and avoidance of tanning beds.    2. Benign findings including: seborrheic keratosis, cherry angioma, DF  - No further intervention required. Patient to report changes.   - Patient reassured of the benign nature of these lesions.    3. Multiple clinically benign nevi  - No further intervention required. Patient to report changes.   - Patient reassured of the benign nature of these lesions.    4.   Actinic keratosis, left nasal tip and right nasal side wall. Discussed precancerous nature of these lesions. Recommended treatment to prevent progression to a squamous cell carcinoma. Advised patient to call for follow up if not resolved in 1 month.   - Cryotherapy procedure note: After verbal consent and discussion of risks and benefits including but no limited to dyspigmentation/scar, blister, and pain, 2 AKs were treated with 1-2mm freeze border for 2 cycles with liquid nitrogen. Post cryotherapy instructions were provided.     5.   History of dysplastic nevus with around  100 nevi, some with congenital appearance  - Patient would be a good candidate for total body photography. Discussed what this would entail and the potential out of pocket expense. Pt would like to pursue. Unfortunately, we are not currently offering due to the COVID-19 pandemic. We will reassess this in the future. Order placed today in hopes we are offering again before patient is due for skin check.    Follow-up in one year for skin check.    Staff Involved:  Staff only    Latosha Benites MD    Department of Dermatology  [facility]: Phone: [phone], Fax:[phone]  [facility]: Phone: [phone], Fax: [phone]

## 2020-08-21 ENCOUNTER — OFFICE VISIT (OUTPATIENT)
Dept: SURGERY | Facility: CLINIC | Age: 55
End: 2020-08-21
Attending: DERMATOLOGY
Payer: COMMERCIAL

## 2020-08-21 VITALS
DIASTOLIC BLOOD PRESSURE: 78 MMHG | WEIGHT: 191.4 LBS | RESPIRATION RATE: 16 BRPM | OXYGEN SATURATION: 100 % | SYSTOLIC BLOOD PRESSURE: 121 MMHG | HEART RATE: 60 BPM | TEMPERATURE: 98.1 F

## 2020-08-21 DIAGNOSIS — L57.8 SUN-DAMAGED SKIN: ICD-10-CM

## 2020-08-21 DIAGNOSIS — D18.01 CHERRY ANGIOMA: ICD-10-CM

## 2020-08-21 DIAGNOSIS — L81.4 SOLAR LENTIGO: ICD-10-CM

## 2020-08-21 DIAGNOSIS — L82.1 SEBORRHEIC KERATOSIS: ICD-10-CM

## 2020-08-21 DIAGNOSIS — D22.9 MULTIPLE BENIGN NEVI: ICD-10-CM

## 2020-08-21 DIAGNOSIS — Z86.018 HISTORY OF DYSPLASTIC NEVUS: Primary | ICD-10-CM

## 2020-08-21 DIAGNOSIS — D23.9 DERMATOFIBROMA: ICD-10-CM

## 2020-08-21 DIAGNOSIS — L57.0 AK (ACTINIC KERATOSIS): ICD-10-CM

## 2020-08-21 PROCEDURE — G0463 HOSPITAL OUTPT CLINIC VISIT: HCPCS | Mod: ZF

## 2020-08-21 PROCEDURE — 17003 DESTRUCT PREMALG LES 2-14: CPT | Mod: ZF | Performed by: DERMATOLOGY

## 2020-08-21 PROCEDURE — 17000 DESTRUCT PREMALG LESION: CPT | Mod: ZF | Performed by: DERMATOLOGY

## 2020-08-21 RX ORDER — FLUTICASONE PROPIONATE 50 MCG
1 SPRAY, SUSPENSION (ML) NASAL DAILY
COMMUNITY

## 2020-08-21 ASSESSMENT — PAIN SCALES - GENERAL: PAINLEVEL: NO PAIN (0)

## 2020-08-21 NOTE — LETTER
8/21/2020         RE: Gatito Collier  [address] [address]        Dear Colleague,    Thank you for referring your patient, Gatito Collier, to the [facility]. Please see a copy of my visit note below.    [facility] Dermatology Note      Dermatology Problem List:  PLC/Melanoma Clinic Patient  Plan for TBP  1.History of Dysplastic Nevi  - L Forearm - Mild Atypia, Bx 9/3/19   - L Paraspinal Back - Compound nevus with mild dysplasia, Bx 1/13/17  - Mid Back - Compound dysplastic nevus with mild atypia, Bx 5/19/15  - R Lower Back - Compound dysplastic nevus with mild to moderate atypia, Bx 5/19/15  - Central Abdomen - Compound dysplastic nevus with mild to moderate atypia, Bx 5/19/15, Exc. for recurrent pigment 2/2/2016  - L Lower back - Junctional dysplastic nevus with mild atypia - narrowly excised, Bx 1/23/15  - Left Chest - Compound dysplastic nevus with severe atypia, Bx 10/21/14, Exc. 1/13/2015  2. Lesion on left calf excised as a child, approximately age 4  3. AK   -left nasal tip s/p cryo 1/9/2020, 8/21/20       Encounter Date: Aug 21, 2020    CC:  Chief Complaint   Patient presents with     Oncology Clinic Visit     Skin check- Hx of Dysplastic Nevi         History of Present Illness:  Mr. Gatito Collier is a 55 year old male who with a history of dysplastic nevus who presents for a skin check. He was last seen on 1/9/20 for a spot check when an AK was treated with cryo. Today, he notes that the spot on the nasal tip did not completely resolve. It has seemed red lately. No pain or bleeding. He notes there may be another similar spot on the right side of the nose. No symptom here either but he feels scale. No other specific skin concerns. No tender, nonhealing, or bleeding skin lesions.     Past Medical History:   Patient Active Problem List   Diagnosis     Allergic rhinitis     Benign neoplasm of skin     CARDIOVASCULAR SCREENING;  LDL GOAL LESS THAN 160     History of dysplastic nevus     Past Medical History:   Diagnosis Date     Allergic rhinitis, cause unspecified     Allergic rhinitis     Benign neoplasm of skin, site unspecified     dysplastic nevi     Calculus of gallbladder without mention of cholecystitis or obstruction     Cholelithiasis     Unspecified disease of pancreas 2/00?    Pancreatitis/ Gall stone     Past Surgical History:   Procedure Laterality Date     COLONOSCOPY N/A 12/1/2017    Procedure: COMBINED COLONOSCOPY, SINGLE OR MULTIPLE BIOPSY/POLYPECTOMY BY BIOPSY;  colonoscopy. POlypectomy per forceps;  Surgeon: Michael Kunz MD;  Location: [facility]     HC EXC BENIGN SKIN LESION TRUNK/ARM/LEG 2.1-3.0 CM      birthmark removal, Left calf - approx age 4     HC REMOVE TONSILS/ADENOIDS,<11 Y/O      T & A <12y.o. 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     Family History:  No family history of melanoma or NMSC.   Reviewed and left in chart for clinician convenience.     Medications:  Current Outpatient Medications   Medication Sig Dispense Refill     fluticasone (FLONASE) 50 MCG/ACT nasal spray Spray 1 spray into both nostrils daily       loratadine (CLARITIN) 10 MG tablet Take 10 mg by mouth as needed        Multiple Vitamins-Minerals (CENTRUM SILVER) per tablet Take 1 tablet by mouth daily       Multiple Vitamins-Minerals (PRESERVISION AREDS 2) CAPS Take by mouth daily         Allergies   Allergen Reactions     No Known Drug Allergies        Review of Systems:  -Constitutional: Otherwise feeling well today, in usual state of health.  -Skin: As above in HPI. No additional skin concerns.    Physical exam:  Vitals: /78 (BP Location: Right arm, Patient Position: Sitting, Cuff Size: Adult Regular)   Pulse 60   Temp 98.1  F (36.7  C) (Oral)   Resp 16   Wt 86.8 kg (191 lb 6.4 oz)   SpO2 100%   GEN: This is a well developed, well-nourished male in no acute  distress, in a pleasant mood.    SKIN: Total skin excluding the undergarment areas was performed. The exam included the head/face, neck, both arms, chest, back, abdomen, both legs, digits and/or nails. Declined genital exam.  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 Mildly tanned today.  -There is an erythematous macule with overyling adherent scale on the left nasal tip and right nasal side wall.  -Pink dome shaped papules on the trunk.  -Brown waxy papule with milia on the left temple.  -Scattered brown macules and papules on the body. All are less than 6mm in size and uniform in pigmentation. On the back, nevi are slightly larger in size than on the extremities, are light brown, and have the pink background and brown checkboard reticular network seen in congenital nevi. Two congenital nevi without significant atypia on the anterior pelvis. Around 100 nevi in all.   -Brown papule that dimples with lateral pressure on the right calf.  -No other lesions of concern on areas examined.       Impression/Plan:  1. Sun damaged skin with solar lentigines and poikiloderma of chivatte.  - Recommend sunscreens SPF #30 or greater, protective clothing and avoidance of tanning beds.    2. Benign findings including: seborrheic keratosis, cherry angioma, DF  - No further intervention required. Patient to report changes.   - Patient reassured of the benign nature of these lesions.    3. Multiple clinically benign nevi  - No further intervention required. Patient to report changes.   - Patient reassured of the benign nature of these lesions.    4.   Actinic keratosis, left nasal tip and right nasal side wall. Discussed precancerous nature of these lesions. Recommended treatment to prevent progression to a squamous cell carcinoma. Advised patient to call for follow up if not resolved in 1 month.   - Cryotherapy procedure note: After verbal consent and discussion  of risks and benefits including but no limited to dyspigmentation/scar, blister, and pain, 2 AKs were treated with 1-2mm freeze border for 2 cycles with liquid nitrogen. Post cryotherapy instructions were provided.     5.   History of dysplastic nevus with around 100 nevi, some with congenital appearance  - Patient would be a good candidate for total body photography. Discussed what this would entail and the potential out of pocket expense. Pt would like to pursue. Unfortunately, we are not currently offering due to the COVID-19 pandemic. We will reassess this in the future. Order placed today in hopes we are offering again before patient is due for skin check.    Follow-up in one year for skin check.    Staff Involved:  Staff only    Latosha Benites MD    Department of Dermatology  [facility]: Phone: [phone], Fax:[phone]  [facility]: Phone: [phone], Fax: [phone]

## 2020-08-21 NOTE — NURSING NOTE
"Oncology Rooming Note    August 21, 2020 12:45 PM   Gatito Collier is a 55 year old male who presents for:    Chief Complaint   Patient presents with     Oncology Clinic Visit     Skin check- Hx of Dysplastic Nevi     Initial Vitals: /78 (BP Location: Right arm, Patient Position: Sitting, Cuff Size: Adult Regular)   Pulse 60   Temp 98.1  F (36.7  C) (Oral)   Resp 16   Wt 86.8 kg (191 lb 6.4 oz)   SpO2 100%  Estimated body mass index is 24.08 kg/m  as calculated from the following:    Height as of 10/10/07: 1.842 m (6' 0.5\").    Weight as of 1/13/15: 81.6 kg (180 lb). There is no height or weight on file to calculate BSA.  No Pain (0) Comment: Data Unavailable   No LMP for male patient.  Allergies reviewed: Yes  Medications reviewed: Yes    Medications: Medication refills not needed today.  Pharmacy name entered into [facility]: [facility] [place], [place] - [address] [place] [address]     Clinical concerns: N/A     Nancy Partida CMA              "

## 2021-01-14 ENCOUNTER — HEALTH MAINTENANCE LETTER (OUTPATIENT)
Age: 56
End: 2021-01-14

## 2021-05-11 ENCOUNTER — HOSPITAL ENCOUNTER (OUTPATIENT)
Dept: MRI IMAGING | Facility: CLINIC | Age: 56
End: 2021-05-11
Attending: EMERGENCY MEDICINE
Payer: COMMERCIAL

## 2021-05-11 DIAGNOSIS — R51.9 HEADACHE: ICD-10-CM

## 2021-05-11 PROCEDURE — 70544 MR ANGIOGRAPHY HEAD W/O DYE: CPT

## 2021-05-11 PROCEDURE — 70551 MRI BRAIN STEM W/O DYE: CPT

## 2021-08-26 RX ORDER — LIDOCAINE HYDROCHLORIDE AND EPINEPHRINE 10; 10 MG/ML; UG/ML
1.5 INJECTION, SOLUTION INFILTRATION; PERINEURAL ONCE
Status: DISCONTINUED | OUTPATIENT
Start: 2021-08-27 | End: 2021-08-27

## 2021-08-26 NOTE — PROGRESS NOTES
[facility] Dermatology Note  Encounter Date: Aug 27, 2021  Office Visit     Dermatology Problem List:  PLC/Melanoma Clinic Patient  Plan for TBP  1.History of Dysplastic Nevi  - L Forearm - Mild Atypia, Bx 9/3/19   - L Paraspinal Back - Compound nevus with mild dysplasia, Bx 1/13/17  - Mid Back - Compound dysplastic nevus with mild atypia, Bx 5/19/15  - R Lower Back - Compound dysplastic nevus with mild to moderate atypia, Bx 5/19/15  - Central Abdomen - Compound dysplastic nevus with mild to moderate atypia, Bx 5/19/15, Exc. for recurrent pigment 2/2/2016  - L Lower back - Junctional dysplastic nevus with mild atypia - narrowly excised, Bx 1/23/15  - Left Chest - Compound dysplastic nevus with severe atypia, Bx 10/21/14, Exc. 1/13/2015  2. Lesion on left calf excised as a child, approximately age 4  3. AK   -left nasal tip s/p cryo   4. 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  Family history: No family history of melanoma or NMSC.   ____________________________________________    Assessment & Plan:     # History of DN with around 100 nevi, some clinically mildly atypical: Good candidate for total body photography. Discussed plans to switch systems in the next year. Patient wished to defer at this time. No evidence of recurrence. While these are benign, they are a marker for increased melanoma risk.   - Recommended yearly skin checks.    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  - Recommend sunscreens SPF #30 or greater, protective clothing and avoidance of tanning beds.    # Benign skin findings including: seborrheic keratoses, cherry angioma, dermatofibroma - minor, self limited problem  - No further intervention required. Patient to report changes.   - Patient reassured of the benign nature of these lesions.    # Multiple clinically benign nevi: Chronic, stable  - No further  intervention required. Patient to report changes.   - Patient reassured of the benign nature of these lesions.    # AK x 1, left nasal tip. Discussed precancerous nature of these lesions. Recommended treatment to prevent progression to a squamous cell carcinoma. Advised patient to call for follow up if not resolved in 1 month.   - See procedure note     # Inflamed/Irritated seborrheic keratosis. Based on the location and chronic irritation to this lesion, treatment with cryotherapy is warranted.  - See procedure note    Procedures Performed:   - Cryotherapy procedure note, location(s): AK on left nasal tip, SK on left cheek. After verbal consent and discussion of risks and benefits including, but not limited to, dyspigmentation/scar, blister, and pain, 1 AK and 1SK were treated with 1-2 mm freeze border for 1-2 cycles with liquid nitrogen. Post cryotherapy instructions were provided.    Follow-up: 1 year(s) in-person, or earlier for new or changing lesions    Staff:     Latosha Benites MD    Department of Dermatology  [facility]: Phone: [phone], Fax:[phone]  [facility]: Phone: [phone], Fax: [phone]    ____________________________________________    CC: Skin Check    HPI:  Mr. Gatito Collier is a(n) 56 year old male who presents today as a return patient for skin check.    Last visit 8/21/20 for skin check. 2 AKs on the nose were treated with cryo at that time.    Today, Eric notes that spot on the nose initially went away but now has recurred. No blister after last cryotherapy. He also notes spot on the left cheek that is getting irritated by his mask. No other specific concerns.    Patient is otherwise feeling well, without additional skin concerns.     Labs Reviewed:  N/A    Physical Exam:  Vitals: /70 (BP Location: Right arm, Patient Position: Sitting, Cuff  Size: Adult Regular)   Pulse 69   Temp 98.1  F (36.7  C) (Oral)   Resp 16   Wt 86.4 kg (190 lb 8 oz)   SpO2 100%   SKIN: Total skin excluding the undergarment areas was performed. The exam included the head/face, neck, both arms, chest, back, abdomen, both legs, digits and/or nails. Buttocks also examined.  -Toribio skin type: II  -Brown macules in sun exposed areas, poikiloderma of chivatte changes to the neck.  -There is an erythematous macule with overyling adherent scale on the left nasal tip.  -Pink dome shaped papules on the trunk.  -Few scattered brown waxy papules on the trunk and face. Inflamed/symptomatic on left cheek.  -Scattered brown macules and papules on the body. All are less than 6mm in size and overall uniform in pigmentation. On the back, nevi are slightly larger in size than on the extremities, are light brown, and have the pink background and brown checkboard reticular network seen in congenital nevi. Around 100 nevi in all.   -Brown papule that dimples with lateral pressure on the right calf.  -No other lesions of concern on areas examined.       Medications:  Current Outpatient Medications   Medication     fluticasone (FLONASE) 50 MCG/ACT nasal spray     loratadine (CLARITIN) 10 MG tablet     Multiple Vitamins-Minerals (CENTRUM SILVER) per tablet     Multiple Vitamins-Minerals (PRESERVISION AREDS 2) CAPS     Current Facility-Administered Medications   Medication     lidocaine 1% with EPINEPHrine 1:100,000 injection 1.5 mL     lidocaine 1% with EPINEPHrine 1:100,000 injection 1.5 mL      Past Medical History:   Patient Active Problem List   Diagnosis     Allergic rhinitis     Benign neoplasm of skin     CARDIOVASCULAR SCREENING; LDL GOAL LESS THAN 160     History of dysplastic nevus     Past Medical History:   Diagnosis Date     Allergic rhinitis, cause unspecified     Allergic rhinitis     Benign neoplasm of skin, site unspecified     dysplastic nevi     Calculus of gallbladder  without mention of cholecystitis or obstruction     Cholelithiasis     Unspecified disease of pancreas 2/00?    Pancreatitis/ Gall stone       CC Referred Self, MD  No address on file on close of this encounter.

## 2021-08-27 ENCOUNTER — OFFICE VISIT (OUTPATIENT)
Dept: SURGERY | Facility: CLINIC | Age: 56
End: 2021-08-27
Attending: DERMATOLOGY
Payer: COMMERCIAL

## 2021-08-27 VITALS
TEMPERATURE: 98.1 F | DIASTOLIC BLOOD PRESSURE: 70 MMHG | WEIGHT: 190.5 LBS | OXYGEN SATURATION: 100 % | HEART RATE: 69 BPM | RESPIRATION RATE: 16 BRPM | SYSTOLIC BLOOD PRESSURE: 130 MMHG

## 2021-08-27 DIAGNOSIS — Z86.018 HISTORY OF DYSPLASTIC NEVUS: Primary | ICD-10-CM

## 2021-08-27 DIAGNOSIS — L82.0 INFLAMED SEBORRHEIC KERATOSIS: ICD-10-CM

## 2021-08-27 DIAGNOSIS — D23.9 DERMATOFIBROMA: ICD-10-CM

## 2021-08-27 DIAGNOSIS — D22.9 MULTIPLE BENIGN NEVI: ICD-10-CM

## 2021-08-27 DIAGNOSIS — L57.3 POIKILODERMA OF CIVATTE: ICD-10-CM

## 2021-08-27 DIAGNOSIS — D18.01 CHERRY ANGIOMA: ICD-10-CM

## 2021-08-27 DIAGNOSIS — L81.4 SOLAR LENTIGO: ICD-10-CM

## 2021-08-27 DIAGNOSIS — L57.8 SUN-DAMAGED SKIN: ICD-10-CM

## 2021-08-27 DIAGNOSIS — L82.1 SEBORRHEIC KERATOSIS: ICD-10-CM

## 2021-08-27 DIAGNOSIS — L57.0 AK (ACTINIC KERATOSIS): ICD-10-CM

## 2021-08-27 PROCEDURE — 17000 DESTRUCT PREMALG LESION: CPT | Mod: XS | Performed by: DERMATOLOGY

## 2021-08-27 PROCEDURE — 17110 DESTRUCTION B9 LES UP TO 14: CPT | Performed by: DERMATOLOGY

## 2021-08-27 PROCEDURE — G0463 HOSPITAL OUTPT CLINIC VISIT: HCPCS | Mod: 25

## 2021-08-27 PROCEDURE — 99213 OFFICE O/P EST LOW 20 MIN: CPT | Mod: 25 | Performed by: DERMATOLOGY

## 2021-08-27 PROCEDURE — G0463 HOSPITAL OUTPT CLINIC VISIT: HCPCS

## 2021-08-27 PROCEDURE — 17000 DESTRUCT PREMALG LESION: CPT | Performed by: DERMATOLOGY

## 2021-08-27 ASSESSMENT — PAIN SCALES - GENERAL: PAINLEVEL: NO PAIN (0)

## 2021-08-27 NOTE — LETTER
8/27/2021         RE: Gatito Collier  [address] [address]        Dear Colleague,    Thank you for referring your patient, Gatito Collier, to the [facility]. Please see a copy of my visit note below.    [facility] Dermatology Note  Encounter Date: Aug 27, 2021  Office Visit     Dermatology Problem List:  PLC/Melanoma Clinic Patient  Plan for TBP  1.History of Dysplastic Nevi  - L Forearm - Mild Atypia, Bx 9/3/19   - L Paraspinal Back - Compound nevus with mild dysplasia, Bx 1/13/17  - Mid Back - Compound dysplastic nevus with mild atypia, Bx 5/19/15  - R Lower Back - Compound dysplastic nevus with mild to moderate atypia, Bx 5/19/15  - Central Abdomen - Compound dysplastic nevus with mild to moderate atypia, Bx 5/19/15, Exc. for recurrent pigment 2/2/2016  - L Lower back - Junctional dysplastic nevus with mild atypia - narrowly excised, Bx 1/23/15  - Left Chest - Compound dysplastic nevus with severe atypia, Bx 10/21/14, Exc. 1/13/2015  2. Lesion on left calf excised as a child, approximately age 4  3. AK   -left nasal tip s/p cryo   4. 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  Family history: No family history of melanoma or NMSC.   ____________________________________________    Assessment & Plan:     # History of DN with around 100 nevi, some clinically mildly atypical: Good candidate for total body photography. Discussed plans to switch systems in the next year. Patient wished to defer at this time. No evidence of recurrence. While these are benign, they are a marker for increased melanoma risk.   - Recommended yearly skin checks.    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  - Recommend sunscreens SPF #30 or greater, protective clothing and avoidance of tanning beds.    # Benign skin findings including: seborrheic  keratoses, cherry angioma, dermatofibroma - minor, self limited problem  - No further intervention required. Patient to report changes.   - Patient reassured of the benign nature of these lesions.    # Multiple clinically benign nevi: Chronic, stable  - No further intervention required. Patient to report changes.   - Patient reassured of the benign nature of these lesions.    # AK x 1, left nasal tip. Discussed precancerous nature of these lesions. Recommended treatment to prevent progression to a squamous cell carcinoma. Advised patient to call for follow up if not resolved in 1 month.   - See procedure note     # Inflamed/Irritated seborrheic keratosis. Based on the location and chronic irritation to this lesion, treatment with cryotherapy is warranted.  - See procedure note    Procedures Performed:   - Cryotherapy procedure note, location(s): AK on left nasal tip, SK on left cheek. After verbal consent and discussion of risks and benefits including, but not limited to, dyspigmentation/scar, blister, and pain, 1 AK and 1SK were treated with 1-2 mm freeze border for 1-2 cycles with liquid nitrogen. Post cryotherapy instructions were provided.    Follow-up: 1 year(s) in-person, or earlier for new or changing lesions    Staff:     Latosha Benites MD    Department of Dermatology  [facility]: Phone: [phone], Fax:[phone]  [facility]: Phone: [phone], Fax: [phone]    ____________________________________________    CC: Skin Check    HPI:  Mr. Gatito Collier is a(n) 56 year old male who presents today as a return patient for skin check.    Last visit 8/21/20 for skin check. 2 AKs on the nose were treated with cryo at that time.    Today, Eric notes that spot on the nose initially went away but now has recurred. No blister after last cryotherapy. He also notes spot on the left  cheek that is getting irritated by his mask. No other specific concerns.    Patient is otherwise feeling well, without additional skin concerns.     Labs Reviewed:  N/A    Physical Exam:  Vitals: /70 (BP Location: Right arm, Patient Position: Sitting, Cuff Size: Adult Regular)   Pulse 69   Temp 98.1  F (36.7  C) (Oral)   Resp 16   Wt 86.4 kg (190 lb 8 oz)   SpO2 100%   SKIN: Total skin excluding the undergarment areas was performed. The exam included the head/face, neck, both arms, chest, back, abdomen, both legs, digits and/or nails. Buttocks also examined.  -Toribio skin type: II  -Brown macules in sun exposed areas, poikiloderma of chivatte changes to the neck.  -There is an erythematous macule with overyling adherent scale on the left nasal tip.  -Pink dome shaped papules on the trunk.  -Few scattered brown waxy papules on the trunk and face. Inflamed/symptomatic on left cheek.  -Scattered brown macules and papules on the body. All are less than 6mm in size and overall uniform in pigmentation. On the back, nevi are slightly larger in size than on the extremities, are light brown, and have the pink background and brown checkboard reticular network seen in congenital nevi. Around 100 nevi in all.   -Brown papule that dimples with lateral pressure on the right calf.  -No other lesions of concern on areas examined.       Medications:  Current Outpatient Medications   Medication     fluticasone (FLONASE) 50 MCG/ACT nasal spray     loratadine (CLARITIN) 10 MG tablet     Multiple Vitamins-Minerals (CENTRUM SILVER) per tablet     Multiple Vitamins-Minerals (PRESERVISION AREDS 2) CAPS     Current Facility-Administered Medications   Medication     lidocaine 1% with EPINEPHrine 1:100,000 injection 1.5 mL     lidocaine 1% with EPINEPHrine 1:100,000 injection 1.5 mL      Past Medical History:   Patient Active Problem List   Diagnosis     Allergic rhinitis     Benign neoplasm of skin     CARDIOVASCULAR  SCREENING; LDL GOAL LESS THAN 160     History of dysplastic nevus     Past Medical History:   Diagnosis Date     Allergic rhinitis, cause unspecified     Allergic rhinitis     Benign neoplasm of skin, site unspecified     dysplastic nevi     Calculus of gallbladder without mention of cholecystitis or obstruction     Cholelithiasis     Unspecified disease of pancreas 2/00?    Pancreatitis/ Gall stone     CC Referred Self, MD  No address on file on close of this encounter.      Latosha Benites MD

## 2021-08-27 NOTE — NURSING NOTE
"Oncology Rooming Note    August 27, 2021 12:39 PM   Gatito Collier is a 56 year old male who presents for:    No chief complaint on file.    Initial Vitals: /70 (BP Location: Right arm, Patient Position: Sitting, Cuff Size: Adult Regular)   Pulse 69   Temp 98.1  F (36.7  C) (Oral)   Resp 16   Wt 86.4 kg (190 lb 8 oz)   SpO2 100%  Estimated body mass index is 24.08 kg/m  as calculated from the following:    Height as of 10/10/07: 1.842 m (6' 0.5\").    Weight as of 1/13/15: 81.6 kg (180 lb). There is no height or weight on file to calculate BSA.  No Pain (0) Comment: Data Unavailable   No LMP for male patient.  Allergies reviewed: Yes  Medications reviewed: Yes    Medications: Medication refills not needed today.  Pharmacy name entered into [employer]: [facility] [place] [id number] [facility]     Clinical concerns: N/A      Nancy Partida CMA                "

## 2021-10-24 ENCOUNTER — HEALTH MAINTENANCE LETTER (OUTPATIENT)
Age: 56
End: 2021-10-24

## 2021-12-18 ENCOUNTER — TELEPHONE (OUTPATIENT)
Dept: ORTHOPEDICS | Facility: CLINIC | Age: 56
End: 2021-12-18

## 2021-12-18 ENCOUNTER — HOSPITAL ENCOUNTER (OUTPATIENT)
Dept: MRI IMAGING | Facility: CLINIC | Age: 56
Discharge: HOME | End: 2021-12-18
Attending: FAMILY MEDICINE | Admitting: FAMILY MEDICINE
Payer: COMMERCIAL

## 2021-12-18 DIAGNOSIS — M25.562 ACUTE PAIN OF LEFT KNEE: ICD-10-CM

## 2021-12-18 DIAGNOSIS — S83.242A ACUTE MEDIAL MENISCUS TEAR, LEFT, INITIAL ENCOUNTER: Primary | ICD-10-CM

## 2021-12-18 DIAGNOSIS — M25.562 ACUTE PAIN OF LEFT KNEE: Primary | ICD-10-CM

## 2021-12-18 PROCEDURE — 99207 PR NO CHARGE LOS: CPT | Performed by: ORTHOPAEDIC SURGERY

## 2021-12-18 PROCEDURE — 73721 MRI JNT OF LWR EXTRE W/O DYE: CPT | Mod: LT

## 2021-12-18 PROCEDURE — 73721 MRI JNT OF LWR EXTRE W/O DYE: CPT | Mod: 26 | Performed by: RADIOLOGY

## 2021-12-18 NOTE — PROGRESS NOTES
Acute injury of left knee yesterday playing basketball.  Significant pain, concern for acute meniscal tear/internal derangement.  Will order MRI and refer to ortho.  Electronically signed by Greg Schoen, MD

## 2021-12-18 NOTE — TELEPHONE ENCOUNTER
I was called by this patient today to discuss his left knee.  He is well-known to me I had the opportunity to treat a number of his family members.  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    He does specific event while playing basketball this week.  Immediate onset of left medial sided knee pain.  Discussed with his primary care physician obtain an MRI.Shows a displaced meniscus tear.  I treated him and his family the past in light of this they reached out to me for discussion of surgical intervention.        MRI confirms displaced medial meniscus tear.    Clinical assessment: Displaced medial meniscus tear    Plan: Long discussion with the patient.  I think he should undergo an arthroscopic meniscectomy.  Discussed with him the risk benefits complication techniques and alternatives.  We will look for time to schedule this can be complete.

## 2021-12-20 ENCOUNTER — TELEPHONE (OUTPATIENT)
Dept: ORTHOPEDICS | Facility: CLINIC | Age: 56
End: 2021-12-20

## 2021-12-20 ENCOUNTER — OFFICE VISIT (OUTPATIENT)
Dept: FAMILY MEDICINE | Facility: CLINIC | Age: 56
End: 2021-12-20
Payer: COMMERCIAL

## 2021-12-20 ENCOUNTER — LAB (OUTPATIENT)
Dept: LAB | Facility: CLINIC | Age: 56
End: 2021-12-20
Payer: COMMERCIAL

## 2021-12-20 VITALS
WEIGHT: 183 LBS | TEMPERATURE: 97.7 F | OXYGEN SATURATION: 100 % | DIASTOLIC BLOOD PRESSURE: 74 MMHG | SYSTOLIC BLOOD PRESSURE: 110 MMHG | HEART RATE: 72 BPM

## 2021-12-20 DIAGNOSIS — S83.242A ACUTE MEDIAL MENISCUS TEAR, LEFT, INITIAL ENCOUNTER: ICD-10-CM

## 2021-12-20 DIAGNOSIS — Z01.818 PREOP GENERAL PHYSICAL EXAM: Primary | ICD-10-CM

## 2021-12-20 DIAGNOSIS — Z11.59 ENCOUNTER FOR SCREENING FOR OTHER VIRAL DISEASES: ICD-10-CM

## 2021-12-20 DIAGNOSIS — Z23 VACCINE FOR DIPHTHERIA-TETANUS-PERTUSSIS, COMBINED: ICD-10-CM

## 2021-12-20 DIAGNOSIS — Z12.5 SCREENING FOR PROSTATE CANCER: ICD-10-CM

## 2021-12-20 DIAGNOSIS — Z13.6 CARDIOVASCULAR SCREENING; LDL GOAL LESS THAN 130: Primary | ICD-10-CM

## 2021-12-20 DIAGNOSIS — Z01.818 PREOP GENERAL PHYSICAL EXAM: ICD-10-CM

## 2021-12-20 DIAGNOSIS — Z23 NEED FOR VACCINATION FOR ZOSTER: ICD-10-CM

## 2021-12-20 LAB
ANION GAP SERPL CALCULATED.3IONS-SCNC: 2 MMOL/L (ref 3–14)
BUN SERPL-MCNC: 15 MG/DL (ref 7–30)
CALCIUM SERPL-MCNC: 8.8 MG/DL (ref 8.5–10.1)
CHLORIDE BLD-SCNC: 106 MMOL/L (ref 94–109)
CHOLEST SERPL-MCNC: 189 MG/DL
CO2 SERPL-SCNC: 32 MMOL/L (ref 20–32)
CREAT SERPL-MCNC: 0.87 MG/DL (ref 0.66–1.25)
ERYTHROCYTE [DISTWIDTH] IN BLOOD BY AUTOMATED COUNT: 13.2 % (ref 10–15)
FASTING STATUS PATIENT QL REPORTED: YES
GFR SERPL CREATININE-BSD FRML MDRD: >90 ML/MIN/1.73M2
GLUCOSE BLD-MCNC: 103 MG/DL (ref 70–99)
HCT VFR BLD AUTO: 45.2 % (ref 40–53)
HDLC SERPL-MCNC: 56 MG/DL
HGB BLD-MCNC: 14.8 G/DL (ref 13.3–17.7)
LDLC SERPL CALC-MCNC: 107 MG/DL
MCH RBC QN AUTO: 30 PG (ref 26.5–33)
MCHC RBC AUTO-ENTMCNC: 32.7 G/DL (ref 31.5–36.5)
MCV RBC AUTO: 92 FL (ref 78–100)
NONHDLC SERPL-MCNC: 133 MG/DL
PLATELET # BLD AUTO: 247 10E3/UL (ref 150–450)
POTASSIUM BLD-SCNC: 4.2 MMOL/L (ref 3.4–5.3)
PSA SERPL-MCNC: 0.61 UG/L (ref 0–4)
PSA SERPL-MCNC: 0.61 UG/L (ref 0–4)
RBC # BLD AUTO: 4.93 10E6/UL (ref 4.4–5.9)
SARS-COV-2 RNA RESP QL NAA+PROBE: NEGATIVE
SODIUM SERPL-SCNC: 140 MMOL/L (ref 133–144)
TRIGL SERPL-MCNC: 132 MG/DL
WBC # BLD AUTO: 6.2 10E3/UL (ref 4–11)

## 2021-12-20 PROCEDURE — U0003 INFECTIOUS AGENT DETECTION BY NUCLEIC ACID (DNA OR RNA); SEVERE ACUTE RESPIRATORY SYNDROME CORONAVIRUS 2 (SARS-COV-2) (CORONAVIRUS DISEASE [COVID-19]), AMPLIFIED PROBE TECHNIQUE, MAKING USE OF HIGH THROUGHPUT TECHNOLOGIES AS DESCRIBED BY CMS-2020-01-R: HCPCS | Performed by: FAMILY MEDICINE

## 2021-12-20 PROCEDURE — 80061 LIPID PANEL: CPT

## 2021-12-20 PROCEDURE — 90750 HZV VACC RECOMBINANT IM: CPT | Performed by: FAMILY MEDICINE

## 2021-12-20 PROCEDURE — 90715 TDAP VACCINE 7 YRS/> IM: CPT | Performed by: FAMILY MEDICINE

## 2021-12-20 PROCEDURE — 84153 ASSAY OF PSA TOTAL: CPT

## 2021-12-20 PROCEDURE — 80048 BASIC METABOLIC PNL TOTAL CA: CPT

## 2021-12-20 PROCEDURE — 90471 IMMUNIZATION ADMIN: CPT | Performed by: FAMILY MEDICINE

## 2021-12-20 PROCEDURE — 90472 IMMUNIZATION ADMIN EACH ADD: CPT | Performed by: FAMILY MEDICINE

## 2021-12-20 PROCEDURE — 85027 COMPLETE CBC AUTOMATED: CPT

## 2021-12-20 PROCEDURE — 99204 OFFICE O/P NEW MOD 45 MIN: CPT | Mod: 25 | Performed by: FAMILY MEDICINE

## 2021-12-20 PROCEDURE — 36415 COLL VENOUS BLD VENIPUNCTURE: CPT

## 2021-12-20 PROCEDURE — U0005 INFEC AGEN DETEC AMPLI PROBE: HCPCS | Performed by: FAMILY MEDICINE

## 2021-12-20 ASSESSMENT — PAIN SCALES - GENERAL: PAINLEVEL: MILD PAIN (2)

## 2021-12-20 NOTE — NURSING NOTE
Prior to immunization administration, verified patients identity using patient s name and date of birth. Please see Immunization Activity for additional information.     Screening Questionnaire for Adult Immunization    Are you sick today?   No   Do you have allergies to medications, food, a vaccine component or latex?   No   Have you ever had a serious reaction after receiving a vaccination?   No   Do you have a long-term health problem with heart, lung, kidney, or metabolic disease (e.g., diabetes), asthma, a blood disorder, no spleen, complement component deficiency, a cochlear implant, or a spinal fluid leak?  Are you on long-term aspirin therapy?   No   Do you have cancer, leukemia, HIV/AIDS, or any other immune system problem?   No   Do you have a parent, brother, or sister with an immune system problem?   No   In the past 3 months, have you taken medications that affect  your immune system, such as prednisone, other steroids, or anticancer drugs; drugs for the treatment of rheumatoid arthritis, Crohn s disease, or psoriasis; or have you had radiation treatments?   No   Have you had a seizure, or a brain or other nervous system problem?   No   During the past year, have you received a transfusion of blood or blood    products, or been given immune (gamma) globulin or antiviral drug?   No   For women: Are you pregnant or is there a chance you could become       pregnant during the next month?   No   Have you received any vaccinations in the past 4 weeks?   No     Immunization questionnaire answers were all negative.        Per orders of Dr. Schoen, injection of tdap and shingrix given by Harriet Cespedes CMA. Patient instructed to remain in clinic for 15 minutes afterwards, and to report any adverse reaction to me immediately.       Screening performed by Harriet Cespedes CMA on 12/20/2021 at 10:48 AM.

## 2021-12-20 NOTE — H&P (VIEW-ONLY)
[address] [address]  Phone: [phone]  Fax: [phone]  Primary Provider: Schoen, Gregory G  Pre-op Performing Provider: SCHOEN, GREGORY G      PREOPERATIVE EVALUATION:  Today's date: 12/20/2021    Gatito Collier is a 56 year old male who presents for a preoperative evaluation.    Surgical Information:  Surgery/Procedure: Examination under anesthesia, knee arthroscopy, meniscectomy  Surgery Location: [facility]  Surgeon: Dr. Lester  Surgery Date: 12/22/21  Time of Surgery: 2:00pm  Where patient plans to recover: At home with family  Fax number for surgical facility: Note does not need to be faxed, will be available electronically in Epic.    Type of Anesthesia Anticipated: Choice    Assessment & Plan     The proposed surgical procedure is considered INTERMEDIATE risk.    Preop general physical exam  Healthy male without any cardio-respiratory risks. Normal exam today.      Vaccine for diphtheria-tetanus-pertussis, combined  Updated.  - TDAP VACCINE (Adacel, Boostrix)  [[id number]]    Need for vaccination for zoster  Discussed pros/cons.  Had a bad case of Chicken Pox as a child. Wishes to proceed with vaccine series.  - ZOSTER VACCINE RECOMBINANT ADJUVANTED IM NJX         Risks and Recommendations:  The patient has the following additional risks and recommendations for perioperative complications:   - No identified additional risk factors other than previously addressed    Medication Instructions:  Patient is on no chronic medications    RECOMMENDATION:  APPROVAL GIVEN to proceed with proposed procedure, without further diagnostic evaluation. COVID swab completed.     0956}    Subjective     HPI related to upcoming procedure: Playing basketball last week and with foot planted and pivoting medially, had acute pain. History of injury about 6 years ago with medial left knee pain off and on but never acute/severe.  MRI completed shows a tear with a  displaced meniscal flap that is likely impinging and causing pain.  Very active physically, doing CrossFit at present along with other recreational sports.     Preop Questions 12/20/2021   1. Have you ever had a heart attack or stroke? No   2. Have you ever had surgery on your heart or blood vessels, such as a stent placement, a coronary artery bypass, or surgery on an artery in your head, neck, heart, or legs? No   3. Do you have chest pain with activity? No   4. Do you have a history of  heart failure? No   5. Do you currently have a cold, bronchitis or symptoms of other infection? No   6. Do you have a cough, shortness of breath, or wheezing? No   7. Do you or anyone in your family have previous history of blood clots? No   8. Do you or does anyone in your family have a serious bleeding problem such as prolonged bleeding following surgeries or cuts? No   9. Have you ever had problems with anemia or been told to take iron pills? No   10. Have you had any abnormal blood loss such as black, tarry or bloody stools? No   11. Have you ever had a blood transfusion? No   12. Are you willing to have a blood transfusion if it is medically needed before, during, or after your surgery? Yes   13. Have you or any of your relatives ever had problems with anesthesia? No   14. Do you have sleep apnea, excessive snoring or daytime drowsiness? No   15. Do you have any artifical heart valves or other implanted medical devices like a pacemaker, defibrillator, or continuous glucose monitor? No   16. Do you have artificial joints? No   17. Are you allergic to latex? No       Health Care Directive:  Patient does not have a Health Care Directive or Living Will: Discussed and will consider.     Preoperative Review of :   reviewed - no record of controlled substances prescribed.      Status of Chronic Conditions:  No chronic conditions other than numerous pre-malignant skin lesions for which he follows regularly with dermatology.      Review of Systems  CONSTITUTIONAL: NEGATIVE for fever, chills, change in weight  INTEGUMENTARY/SKIN: NEGATIVE for worrisome rashes, moles or lesions  EYES: NEGATIVE for vision changes or irritation;reports history of traumatic retinal tear in right eye without issues at present.   ENT/MOUTH: NEGATIVE for ear, mouth and throat problems  RESP: NEGATIVE for significant cough or SOB  CV: NEGATIVE for chest pain, palpitations or peripheral edema  GI: NEGATIVE for nausea, abdominal pain, heartburn, or change in bowel habits  : NEGATIVE for frequency, dysuria, or hematuria  MUSCULOSKELETAL: NEGATIVE for significant arthralgias or myalgia  NEURO: NEGATIVE for weakness, dizziness or paresthesias  ENDOCRINE: NEGATIVE for temperature intolerance, skin/hair changes  HEME: NEGATIVE for bleeding problems  PSYCHIATRIC: NEGATIVE for changes in mood or affect    Patient Active Problem List    Diagnosis Date Noted     Acute medial meniscus tear, left, initial encounter 12/20/2021     Priority: Medium     Added automatically from request for surgery [id number]       History of dysplastic nevus 05/19/2015     Priority: Medium     CARDIOVASCULAR SCREENING; LDL GOAL LESS THAN 160 10/31/2010     Priority: Medium     Allergic rhinitis 01/06/2004     Priority: Medium     Problem list name updated by automated process. Provider to review       Benign neoplasm of skin 01/06/2004     Priority: Medium     Problem list name updated by automated process. Provider to review        Past Medical History:   Diagnosis Date     Allergic rhinitis, cause unspecified     Allergic rhinitis     Benign neoplasm of skin, site unspecified     dysplastic nevi     Calculus of gallbladder without mention of cholecystitis or obstruction     Cholelithiasis     Unspecified disease of pancreas 2/00?    Pancreatitis/ Gall stone     Past Surgical History:   Procedure Laterality Date     COLONOSCOPY N/A 12/1/2017    Procedure: COMBINED COLONOSCOPY, SINGLE OR MULTIPLE  BIOPSY/POLYPECTOMY BY BIOPSY;  colonoscopy. POlypectomy per forceps;  Surgeon: Michael Kunz MD;  Location: [facility]     HC EXC BENIGN SKIN LESION TRUNK/ARM/LEG 2.1-3.0 CM      birthmark removal, Left calf - approx age 4     HC REMOVE TONSILS/ADENOIDS,<13 Y/O      T & A <12y.o. (approx age 3)     Current Outpatient Medications   Medication Sig Dispense Refill     fluticasone (FLONASE) 50 MCG/ACT nasal spray Spray 1 spray into both nostrils daily       loratadine (CLARITIN) 10 MG tablet Take 10 mg by mouth as needed        Multiple Vitamins-Minerals (CENTRUM SILVER) per tablet Take 1 tablet by mouth daily       Multiple Vitamins-Minerals (PRESERVISION AREDS 2) CAPS Take by mouth daily         Allergies   Allergen Reactions     No Known Drug Allergies         Social History     Tobacco Use     Smoking status: Never Smoker     Smokeless tobacco: Never Used   Substance Use Topics     Alcohol use: Yes     Alcohol/week: 3.3 standard drinks       History   Drug Use No         Objective     /74 (Cuff Size: Adult Regular)   Pulse 72   Temp 97.7  F (36.5  C) (Temporal)   Wt 83 kg (183 lb)   SpO2 100%     Physical Exam    GENERAL APPEARANCE: healthy, alert and no distress     EYES: EOMI,  PERRL     HENT: ear canals and TM's normal and nose and mouth without ulcers or lesions     NECK: no adenopathy, no asymmetry, masses, or scars and thyroid normal to palpation     RESP: lungs clear to auscultation - no rales, rhonchi or wheezes     CV: regular rates and rhythm, normal S1 S2, no S3 or S4 and no murmur, click or rub     ABDOMEN:  soft, nontender, no HSM or masses and bowel sounds normal     MS: extremities normal- no gross deformities noted, no evidence of inflammation in joints, FROM in all extremities.     SKIN: no acute suspicious lesions or rashes at present; numerous moles being followed by derm.      NEURO: Normal strength and tone, cranial nerves grossly intact, normal reflexes, equal grasp, no focal asymmetry,  mentation intact and speech normal     PSYCH: mentation appears normal. and affect normal/bright     LYMPHATICS: No cervical adenopathy    Recent Labs   Lab Test 12/20/21  0800 12/20/21  0759   HGB  --  14.8   PLT  --  247     --    POTASSIUM 4.2  --    CR 0.87  --         Diagnostics:     No EKG required, no history of coronary heart disease, significant arrhythmia, peripheral arterial disease or other structural heart disease.    Revised Cardiac Risk Index (RCRI):  The patient has the following serious cardiovascular risks for perioperative complications:   - No serious cardiac risks = 0 points     RCRI Interpretation: 0 points: Class I (very low risk - 0.4% complication rate)           Signed Electronically by: Gregory G. Schoen, MD  Copy of this evaluation report is provided to requesting physician.

## 2021-12-20 NOTE — PATIENT INSTRUCTIONS

## 2021-12-20 NOTE — PROGRESS NOTES
[address] [address]  Phone: [phone]  Fax: [phone]  Primary Provider: Schoen, Gregory G  Pre-op Performing Provider: SCHOEN, GREGORY G      PREOPERATIVE EVALUATION:  Today's date: 12/20/2021    Gatito Collier is a 56 year old male who presents for a preoperative evaluation.    Surgical Information:  Surgery/Procedure: Examination under anesthesia, knee arthroscopy, meniscectomy  Surgery Location: [facility]  Surgeon: Dr. Lester  Surgery Date: 12/22/21  Time of Surgery: 2:00pm  Where patient plans to recover: At home with family  Fax number for surgical facility: Note does not need to be faxed, will be available electronically in Epic.    Type of Anesthesia Anticipated: Choice    Assessment & Plan     The proposed surgical procedure is considered INTERMEDIATE risk.    Preop general physical exam  Healthy male without any cardio-respiratory risks. Normal exam today.      Vaccine for diphtheria-tetanus-pertussis, combined  Updated.  - TDAP VACCINE (Adacel, Boostrix)  [[id number]]    Need for vaccination for zoster  Discussed pros/cons.  Had a bad case of Chicken Pox as a child. Wishes to proceed with vaccine series.  - ZOSTER VACCINE RECOMBINANT ADJUVANTED IM NJX         Risks and Recommendations:  The patient has the following additional risks and recommendations for perioperative complications:   - No identified additional risk factors other than previously addressed    Medication Instructions:  Patient is on no chronic medications    RECOMMENDATION:  APPROVAL GIVEN to proceed with proposed procedure, without further diagnostic evaluation. COVID swab completed.     0956}    Subjective     HPI related to upcoming procedure: Playing basketball last week and with foot planted and pivoting medially, had acute pain. History of injury about 6 years ago with medial left knee pain off and on but never acute/severe.  MRI completed shows a tear with a  displaced meniscal flap that is likely impinging and causing pain.  Very active physically, doing CrossFit at present along with other recreational sports.     Preop Questions 12/20/2021   1. Have you ever had a heart attack or stroke? No   2. Have you ever had surgery on your heart or blood vessels, such as a stent placement, a coronary artery bypass, or surgery on an artery in your head, neck, heart, or legs? No   3. Do you have chest pain with activity? No   4. Do you have a history of  heart failure? No   5. Do you currently have a cold, bronchitis or symptoms of other infection? No   6. Do you have a cough, shortness of breath, or wheezing? No   7. Do you or anyone in your family have previous history of blood clots? No   8. Do you or does anyone in your family have a serious bleeding problem such as prolonged bleeding following surgeries or cuts? No   9. Have you ever had problems with anemia or been told to take iron pills? No   10. Have you had any abnormal blood loss such as black, tarry or bloody stools? No   11. Have you ever had a blood transfusion? No   12. Are you willing to have a blood transfusion if it is medically needed before, during, or after your surgery? Yes   13. Have you or any of your relatives ever had problems with anesthesia? No   14. Do you have sleep apnea, excessive snoring or daytime drowsiness? No   15. Do you have any artifical heart valves or other implanted medical devices like a pacemaker, defibrillator, or continuous glucose monitor? No   16. Do you have artificial joints? No   17. Are you allergic to latex? No       Health Care Directive:  Patient does not have a Health Care Directive or Living Will: Discussed and will consider.     Preoperative Review of :   reviewed - no record of controlled substances prescribed.      Status of Chronic Conditions:  No chronic conditions other than numerous pre-malignant skin lesions for which he follows regularly with dermatology.      Review of Systems  CONSTITUTIONAL: NEGATIVE for fever, chills, change in weight  INTEGUMENTARY/SKIN: NEGATIVE for worrisome rashes, moles or lesions  EYES: NEGATIVE for vision changes or irritation;reports history of traumatic retinal tear in right eye without issues at present.   ENT/MOUTH: NEGATIVE for ear, mouth and throat problems  RESP: NEGATIVE for significant cough or SOB  CV: NEGATIVE for chest pain, palpitations or peripheral edema  GI: NEGATIVE for nausea, abdominal pain, heartburn, or change in bowel habits  : NEGATIVE for frequency, dysuria, or hematuria  MUSCULOSKELETAL: NEGATIVE for significant arthralgias or myalgia  NEURO: NEGATIVE for weakness, dizziness or paresthesias  ENDOCRINE: NEGATIVE for temperature intolerance, skin/hair changes  HEME: NEGATIVE for bleeding problems  PSYCHIATRIC: NEGATIVE for changes in mood or affect    Patient Active Problem List    Diagnosis Date Noted     Acute medial meniscus tear, left, initial encounter 12/20/2021     Priority: Medium     Added automatically from request for surgery [id number]       History of dysplastic nevus 05/19/2015     Priority: Medium     CARDIOVASCULAR SCREENING; LDL GOAL LESS THAN 160 10/31/2010     Priority: Medium     Allergic rhinitis 01/06/2004     Priority: Medium     Problem list name updated by automated process. Provider to review       Benign neoplasm of skin 01/06/2004     Priority: Medium     Problem list name updated by automated process. Provider to review        Past Medical History:   Diagnosis Date     Allergic rhinitis, cause unspecified     Allergic rhinitis     Benign neoplasm of skin, site unspecified     dysplastic nevi     Calculus of gallbladder without mention of cholecystitis or obstruction     Cholelithiasis     Unspecified disease of pancreas 2/00?    Pancreatitis/ Gall stone     Past Surgical History:   Procedure Laterality Date     COLONOSCOPY N/A 12/1/2017    Procedure: COMBINED COLONOSCOPY, SINGLE OR MULTIPLE  BIOPSY/POLYPECTOMY BY BIOPSY;  colonoscopy. POlypectomy per forceps;  Surgeon: Michael Kunz MD;  Location: [facility]     HC EXC BENIGN SKIN LESION TRUNK/ARM/LEG 2.1-3.0 CM      birthmark removal, Left calf - approx age 4     HC REMOVE TONSILS/ADENOIDS,<13 Y/O      T & A <12y.o. (approx age 3)     Current Outpatient Medications   Medication Sig Dispense Refill     fluticasone (FLONASE) 50 MCG/ACT nasal spray Spray 1 spray into both nostrils daily       loratadine (CLARITIN) 10 MG tablet Take 10 mg by mouth as needed        Multiple Vitamins-Minerals (CENTRUM SILVER) per tablet Take 1 tablet by mouth daily       Multiple Vitamins-Minerals (PRESERVISION AREDS 2) CAPS Take by mouth daily         Allergies   Allergen Reactions     No Known Drug Allergies         Social History     Tobacco Use     Smoking status: Never Smoker     Smokeless tobacco: Never Used   Substance Use Topics     Alcohol use: Yes     Alcohol/week: 3.3 standard drinks       History   Drug Use No         Objective     /74 (Cuff Size: Adult Regular)   Pulse 72   Temp 97.7  F (36.5  C) (Temporal)   Wt 83 kg (183 lb)   SpO2 100%     Physical Exam    GENERAL APPEARANCE: healthy, alert and no distress     EYES: EOMI,  PERRL     HENT: ear canals and TM's normal and nose and mouth without ulcers or lesions     NECK: no adenopathy, no asymmetry, masses, or scars and thyroid normal to palpation     RESP: lungs clear to auscultation - no rales, rhonchi or wheezes     CV: regular rates and rhythm, normal S1 S2, no S3 or S4 and no murmur, click or rub     ABDOMEN:  soft, nontender, no HSM or masses and bowel sounds normal     MS: extremities normal- no gross deformities noted, no evidence of inflammation in joints, FROM in all extremities.     SKIN: no acute suspicious lesions or rashes at present; numerous moles being followed by derm.      NEURO: Normal strength and tone, cranial nerves grossly intact, normal reflexes, equal grasp, no focal asymmetry,  mentation intact and speech normal     PSYCH: mentation appears normal. and affect normal/bright     LYMPHATICS: No cervical adenopathy    Recent Labs   Lab Test 12/20/21  0800 12/20/21  0759   HGB  --  14.8   PLT  --  247     --    POTASSIUM 4.2  --    CR 0.87  --         Diagnostics:     No EKG required, no history of coronary heart disease, significant arrhythmia, peripheral arterial disease or other structural heart disease.    Revised Cardiac Risk Index (RCRI):  The patient has the following serious cardiovascular risks for perioperative complications:   - No serious cardiac risks = 0 points     RCRI Interpretation: 0 points: Class I (very low risk - 0.4% complication rate)           Signed Electronically by: Gregory G. Schoen, MD  Copy of this evaluation report is provided to requesting physician.

## 2021-12-20 NOTE — TELEPHONE ENCOUNTER
Patient is scheduled for surgery with Dr. Lester    Spoke with: Patient    Date of Surgery: 1/22/21    Location: [facility]    Post op: 1 week    Pre op with Provider: Complete    H&P: Scheduled with PCP 12/20/21    Pre-procedure COVID-19 Test: Jayy 12/20/21    Additional imaging/appointments: N/A    Surgery packet: N/A     Additional comments: N/A

## 2021-12-21 ENCOUNTER — ANESTHESIA EVENT (OUTPATIENT)
Dept: SURGERY | Facility: AMBULATORY SURGERY CENTER | Age: 56
End: 2021-12-21
Payer: COMMERCIAL

## 2021-12-22 ENCOUNTER — ANESTHESIA (OUTPATIENT)
Dept: SURGERY | Facility: AMBULATORY SURGERY CENTER | Age: 56
End: 2021-12-22
Payer: COMMERCIAL

## 2021-12-22 ENCOUNTER — HOSPITAL ENCOUNTER (OUTPATIENT)
Facility: AMBULATORY SURGERY CENTER | Age: 56
End: 2021-12-22
Attending: ORTHOPAEDIC SURGERY
Payer: COMMERCIAL

## 2021-12-22 VITALS
HEIGHT: 72 IN | SYSTOLIC BLOOD PRESSURE: 118 MMHG | WEIGHT: 185 LBS | RESPIRATION RATE: 18 BRPM | TEMPERATURE: 96.9 F | OXYGEN SATURATION: 99 % | HEART RATE: 70 BPM | DIASTOLIC BLOOD PRESSURE: 60 MMHG | BODY MASS INDEX: 25.06 KG/M2

## 2021-12-22 DIAGNOSIS — S83.242A ACUTE MEDIAL MENISCUS TEAR, LEFT, INITIAL ENCOUNTER: ICD-10-CM

## 2021-12-22 DIAGNOSIS — M25.562 LEFT KNEE PAIN, UNSPECIFIED CHRONICITY: Primary | ICD-10-CM

## 2021-12-22 PROCEDURE — 29881 ARTHRS KNE SRG MNISECTMY M/L: CPT | Mod: LT | Performed by: ORTHOPAEDIC SURGERY

## 2021-12-22 PROCEDURE — 29881 ARTHRS KNE SRG MNISECTMY M/L: CPT | Mod: LT

## 2021-12-22 PROCEDURE — C9290 INJ, BUPIVACAINE LIPOSOME: HCPCS

## 2021-12-22 RX ORDER — ONDANSETRON 2 MG/ML
4 INJECTION INTRAMUSCULAR; INTRAVENOUS EVERY 30 MIN PRN
Status: DISCONTINUED | OUTPATIENT
Start: 2021-12-22 | End: 2021-12-23 | Stop reason: HOSPADM

## 2021-12-22 RX ORDER — CEFAZOLIN SODIUM 2 G/50ML
2 SOLUTION INTRAVENOUS
Status: COMPLETED | OUTPATIENT
Start: 2021-12-22 | End: 2021-12-22

## 2021-12-22 RX ORDER — ACETAMINOPHEN 325 MG/1
650 TABLET ORAL
Status: DISCONTINUED | OUTPATIENT
Start: 2021-12-22 | End: 2021-12-23 | Stop reason: HOSPADM

## 2021-12-22 RX ORDER — FENTANYL CITRATE 50 UG/ML
25-50 INJECTION, SOLUTION INTRAMUSCULAR; INTRAVENOUS
Status: DISCONTINUED | OUTPATIENT
Start: 2021-12-22 | End: 2021-12-22 | Stop reason: HOSPADM

## 2021-12-22 RX ORDER — HYDROMORPHONE HYDROCHLORIDE 1 MG/ML
0.4 INJECTION, SOLUTION INTRAMUSCULAR; INTRAVENOUS; SUBCUTANEOUS EVERY 10 MIN PRN
Status: DISCONTINUED | OUTPATIENT
Start: 2021-12-22 | End: 2021-12-22 | Stop reason: HOSPADM

## 2021-12-22 RX ORDER — MEPERIDINE HYDROCHLORIDE 25 MG/ML
12.5 INJECTION INTRAMUSCULAR; INTRAVENOUS; SUBCUTANEOUS
Status: DISCONTINUED | OUTPATIENT
Start: 2021-12-22 | End: 2021-12-23 | Stop reason: HOSPADM

## 2021-12-22 RX ORDER — FENTANYL CITRATE 50 UG/ML
50 INJECTION, SOLUTION INTRAMUSCULAR; INTRAVENOUS EVERY 5 MIN PRN
Status: DISCONTINUED | OUTPATIENT
Start: 2021-12-22 | End: 2021-12-22 | Stop reason: HOSPADM

## 2021-12-22 RX ORDER — OXYCODONE HYDROCHLORIDE 5 MG/1
5 TABLET ORAL
Status: DISCONTINUED | OUTPATIENT
Start: 2021-12-22 | End: 2021-12-23 | Stop reason: HOSPADM

## 2021-12-22 RX ORDER — LIDOCAINE 40 MG/G
CREAM TOPICAL
Status: DISCONTINUED | OUTPATIENT
Start: 2021-12-22 | End: 2021-12-22 | Stop reason: HOSPADM

## 2021-12-22 RX ORDER — ALBUTEROL SULFATE 0.83 MG/ML
2.5 SOLUTION RESPIRATORY (INHALATION) EVERY 4 HOURS PRN
Status: DISCONTINUED | OUTPATIENT
Start: 2021-12-22 | End: 2021-12-22 | Stop reason: HOSPADM

## 2021-12-22 RX ORDER — NALOXONE HYDROCHLORIDE 0.4 MG/ML
0.2 INJECTION, SOLUTION INTRAMUSCULAR; INTRAVENOUS; SUBCUTANEOUS
Status: DISCONTINUED | OUTPATIENT
Start: 2021-12-22 | End: 2021-12-22 | Stop reason: HOSPADM

## 2021-12-22 RX ORDER — GABAPENTIN 300 MG/1
300 CAPSULE ORAL
Status: COMPLETED | OUTPATIENT
Start: 2021-12-22 | End: 2021-12-22

## 2021-12-22 RX ORDER — KETOROLAC TROMETHAMINE 30 MG/ML
30 INJECTION, SOLUTION INTRAMUSCULAR; INTRAVENOUS EVERY 6 HOURS PRN
Status: DISCONTINUED | OUTPATIENT
Start: 2021-12-22 | End: 2021-12-23 | Stop reason: HOSPADM

## 2021-12-22 RX ORDER — HALOPERIDOL 5 MG/ML
1 INJECTION INTRAMUSCULAR
Status: DISCONTINUED | OUTPATIENT
Start: 2021-12-22 | End: 2021-12-23 | Stop reason: HOSPADM

## 2021-12-22 RX ORDER — NALOXONE HYDROCHLORIDE 0.4 MG/ML
0.4 INJECTION, SOLUTION INTRAMUSCULAR; INTRAVENOUS; SUBCUTANEOUS
Status: DISCONTINUED | OUTPATIENT
Start: 2021-12-22 | End: 2021-12-22 | Stop reason: HOSPADM

## 2021-12-22 RX ORDER — ONDANSETRON 4 MG/1
4 TABLET, ORALLY DISINTEGRATING ORAL
Status: DISCONTINUED | OUTPATIENT
Start: 2021-12-22 | End: 2021-12-23 | Stop reason: HOSPADM

## 2021-12-22 RX ORDER — ACETAMINOPHEN 325 MG/1
975 TABLET ORAL ONCE
Status: DISCONTINUED | OUTPATIENT
Start: 2021-12-22 | End: 2021-12-22 | Stop reason: HOSPADM

## 2021-12-22 RX ORDER — SODIUM CHLORIDE, SODIUM LACTATE, POTASSIUM CHLORIDE, CALCIUM CHLORIDE 600; 310; 30; 20 MG/100ML; MG/100ML; MG/100ML; MG/100ML
INJECTION, SOLUTION INTRAVENOUS CONTINUOUS
Status: DISCONTINUED | OUTPATIENT
Start: 2021-12-22 | End: 2021-12-22 | Stop reason: HOSPADM

## 2021-12-22 RX ORDER — CEFAZOLIN SODIUM 2 G/50ML
2 SOLUTION INTRAVENOUS SEE ADMIN INSTRUCTIONS
Status: DISCONTINUED | OUTPATIENT
Start: 2021-12-22 | End: 2021-12-22 | Stop reason: HOSPADM

## 2021-12-22 RX ORDER — BUPIVACAINE HYDROCHLORIDE 5 MG/ML
INJECTION, SOLUTION EPIDURAL; INTRACAUDAL
Status: COMPLETED | OUTPATIENT
Start: 2021-12-22 | End: 2021-12-22

## 2021-12-22 RX ORDER — OXYCODONE HYDROCHLORIDE 5 MG/1
5 TABLET ORAL EVERY 4 HOURS PRN
Status: DISCONTINUED | OUTPATIENT
Start: 2021-12-22 | End: 2021-12-23 | Stop reason: HOSPADM

## 2021-12-22 RX ORDER — PROPOFOL 10 MG/ML
INJECTION, EMULSION INTRAVENOUS PRN
Status: DISCONTINUED | OUTPATIENT
Start: 2021-12-22 | End: 2021-12-22

## 2021-12-22 RX ORDER — ACETAMINOPHEN 325 MG/1
975 TABLET ORAL ONCE
Status: COMPLETED | OUTPATIENT
Start: 2021-12-22 | End: 2021-12-22

## 2021-12-22 RX ORDER — BUPIVACAINE HYDROCHLORIDE AND EPINEPHRINE 2.5; 5 MG/ML; UG/ML
INJECTION, SOLUTION INFILTRATION; PERINEURAL PRN
Status: DISCONTINUED | OUTPATIENT
Start: 2021-12-22 | End: 2021-12-22 | Stop reason: HOSPADM

## 2021-12-22 RX ORDER — PROPOFOL 10 MG/ML
INJECTION, EMULSION INTRAVENOUS CONTINUOUS PRN
Status: DISCONTINUED | OUTPATIENT
Start: 2021-12-22 | End: 2021-12-22

## 2021-12-22 RX ORDER — ONDANSETRON 4 MG/1
4 TABLET, ORALLY DISINTEGRATING ORAL EVERY 30 MIN PRN
Status: DISCONTINUED | OUTPATIENT
Start: 2021-12-22 | End: 2021-12-23 | Stop reason: HOSPADM

## 2021-12-22 RX ORDER — FLUMAZENIL 0.1 MG/ML
0.2 INJECTION, SOLUTION INTRAVENOUS
Status: DISCONTINUED | OUTPATIENT
Start: 2021-12-22 | End: 2021-12-22 | Stop reason: HOSPADM

## 2021-12-22 RX ADMIN — PROPOFOL 180 MG: 10 INJECTION, EMULSION INTRAVENOUS at 14:09

## 2021-12-22 RX ADMIN — BUPIVACAINE HYDROCHLORIDE 10 ML: 5 INJECTION, SOLUTION EPIDURAL; INTRACAUDAL at 13:55

## 2021-12-22 RX ADMIN — PROPOFOL 150 MCG/KG/MIN: 10 INJECTION, EMULSION INTRAVENOUS at 14:09

## 2021-12-22 RX ADMIN — SODIUM CHLORIDE, SODIUM LACTATE, POTASSIUM CHLORIDE, CALCIUM CHLORIDE: 600; 310; 30; 20 INJECTION, SOLUTION INTRAVENOUS at 14:05

## 2021-12-22 RX ADMIN — KETOROLAC TROMETHAMINE 30 MG: 30 INJECTION, SOLUTION INTRAMUSCULAR; INTRAVENOUS at 15:29

## 2021-12-22 RX ADMIN — CEFAZOLIN SODIUM 2 G: 2 SOLUTION INTRAVENOUS at 14:05

## 2021-12-22 RX ADMIN — FENTANYL CITRATE 50 MCG: 50 INJECTION, SOLUTION INTRAMUSCULAR; INTRAVENOUS at 13:52

## 2021-12-22 RX ADMIN — GABAPENTIN 300 MG: 300 CAPSULE ORAL at 12:58

## 2021-12-22 RX ADMIN — ACETAMINOPHEN 975 MG: 325 TABLET ORAL at 12:57

## 2021-12-22 ASSESSMENT — MIFFLIN-ST. JEOR: SCORE: 1707.15

## 2021-12-22 NOTE — ANESTHESIA PROCEDURE NOTES
Adductor canal Procedure Note    Pre-Procedure   Staff -        Anesthesiologist:  Zeeshan Morgan MD       Performed By: anesthesiologist       Location: pre-op       Procedure Start/Stop Times: 12/22/2021 1:45 PM and 12/22/2021 1:55 PM       Pre-Anesthestic Checklist: patient identified, IV checked, site marked, risks and benefits discussed, informed consent, monitors and equipment checked, pre-op evaluation, at physician/surgeon's request and post-op pain management  Timeout:       Correct Patient: Yes        Correct Procedure: Yes        Correct Site: Yes        Correct Position: Yes        Correct Laterality: Yes        Site Marked: Yes  Procedure Documentation  Procedure: Adductor canal       Laterality: left       Patient Position: supine       Skin prep: Chloraprep       Needle Type: insulated and short bevel       Needle Gauge: 21.        Needle Length (millimeters): 100        Ultrasound guided       1. Ultrasound was used to identify targeted nerve, plexus, vascular marker, or fascial plane and place a needle adjacent to it in real-time.       2. Ultrasound was used to visualize the spread of anesthetic in close proximity to the above referenced structure.       3. A permanent image is entered into the patient's record.       4. The visualized anatomic structures appeared normal.       5. There were no apparent abnormal pathologic findings.    Assessment/Narrative         The placement was negative for: blood aspirated and site bleeding       Paresthesias: No.     Bolus given via needle..        Secured via.        Insertion/Infusion Method: Single Shot       Complications: none       Injection made incrementally with aspirations every 2 mL.    Medication(s) Administered   Bupivacaine 0.5% PF (Infiltration), 10 mL  Bupivacaine liposome (Exparel) 1.3% LA inj susp (Infiltration), 10 mL  Medication Administration Time: 12/22/2021 1:55 PM

## 2021-12-22 NOTE — ANESTHESIA POSTPROCEDURE EVALUATION
Patient: Gatito Collier    Procedure: Procedure(s):  Left Knee Examination under anesthesia, arthroscopy, meniscectomy       Diagnosis:Acute medial meniscus tear, left, initial encounter [S83.409A]  Diagnosis Additional Information: No value filed.    Anesthesia Type:  General    Note:  Disposition: Outpatient   Postop Pain Control: Uneventful            Sign Out: Well controlled pain   PONV:    Neuro/Psych: Uneventful            Sign Out: Acceptable/Baseline neuro status   Airway/Respiratory: Uneventful            Sign Out: Acceptable/Baseline resp. status   CV/Hemodynamics: Uneventful            Sign Out: Acceptable CV status; No obvious hypovolemia; No obvious fluid overload   Other NRE:    DID A NON-ROUTINE EVENT OCCUR?            Last vitals:  Vitals Value Taken Time   BP 97/57 12/22/21 1515   Temp 36.1  C (96.9  F) 12/22/21 1459   Pulse 73 12/22/21 1526   Resp 13 12/22/21 1526   SpO2 96 % 12/22/21 1526   Vitals shown include unvalidated device data.    Electronically Signed By: Magen Cano MD  December 22, 2021  3:27 PM

## 2021-12-22 NOTE — ANESTHESIA CARE TRANSFER NOTE
Patient: Gatito Collier    Procedure: Procedure(s):  Left Knee Examination under anesthesia, arthroscopy, meniscectomy       Diagnosis: Acute medial meniscus tear, left, initial encounter [S83.242A]  Diagnosis Additional Information: No value filed.    Anesthesia Type:   General     Note:    Oropharynx: oropharynx clear of all foreign objects  Level of Consciousness: drowsy  Oxygen Supplementation: nasal cannula  Level of Supplemental Oxygen (L/min / FiO2): 3  Independent Airway: airway patency satisfactory and stable  Dentition: dentition unchanged  Vital Signs Stable: post-procedure vital signs reviewed and stable  Report to RN Given: handoff report given  Patient transferred to: PACU    Handoff Report: Identifed the Patient, Identified the Reponsible Provider, Reviewed the pertinent medical history, Discussed the surgical course, Reviewed Intra-OP anesthesia mangement and issues during anesthesia, Set expectations for post-procedure period and Allowed opportunity for questions and acknowledgement of understanding      Vitals:  Vitals Value Taken Time   BP 92/57 12/22/21 1459   Temp     Pulse 59 12/22/21 1500   Resp 62 12/22/21 1500   SpO2 97 % 12/22/21 1501   Vitals shown include unvalidated device data.    Electronically Signed By: VIOLETA Polk CRNA  December 22, 2021  3:02 PM

## 2021-12-22 NOTE — OP NOTE
PREOPERATIVE DIAGNOSIS: Left knee medial meniscus tear.   POSTOPERATIVE DIAGNOSIS: Left knee medial meniscus tear.   PROCEDURES:   1. Examination under anesthesia, left knee.   2. Left knee arthroscopy, partial medial meniscectomy.   SURGEON: Barry Lester MD   ASSISTANT: Noris Suarez PA-C. The assistance of Ms. Suarez was necessary for positioning, arthroscopic visualization, retraction   FELLOW: Sharmaine Candelaria MD  OPERATIVE INDICATIONS: Gatito is a very pleasant 56 year old male who presented to my clinic with medial joint line pain. An MRI had been obtained by his primary care physician demonstrating a displaced medial meniscus tear. I reviewed with him his history, physical and imaging exam. I felt that he would be a candidate for knee arthroscopy, partial medial meniscectomy. He was apprised of the risks and benefits of surgery and desired to proceed despite the risks.   OPERATIVE DETAILS: In the preoperative area, the patient's informed consent was reviewed and he desired to proceed. Left knee was marked. The patient was in agreement. he was taken to the operating room, timeout was performed and all parties were in agreement. Preoperative antibiotics was given within 1 hour of time of surgery. He was placed supine on the operating room table, surrendered to LMA anesthesia and examination under anesthesia was performed with the following findings: Full passive range of motion 0 to 135 degrees. No patellar instability. Stable to varus and valgus stress at 0 and 30 degrees. Stable anterior, posterior drawer. No pivot shift. Negative dial test. Posterior drawer 0. Lachman 0. At this time, a bump was placed underneath the ipsilateral hip. Egg crate was placed beneath the well leg. No tourniquet was placed. A side post was utilized. Left leg was prepped and draped in the usual sterile fashion. Standard anteromedial and anterolateral arthroscopic portals were created and diagnostic arthroscopy was  performed with the following findings: Medial patellar facet was normal, lateral patellar facet was normal, central patellar ridge was normal, central trochlea was normal, medial femoral condyle was normal, medial tibial plateau was normal, lateral femoral condyle was normal, lateral tibial plateau was normal. Lateral meniscus was intact. Medial meniscus had a displaced tear. ACL and PCL were intact. Popliteus tendon intact.    Alternating then between a motorized shaver and a small biter, a partial medial meniscectomy was performed until a balanced stable rim of meniscal tissue remained. At this time, all excess arthroscopic fluid and meniscal debris was removed from the knee joint. Copious irrigation was performed. Portal sites were closed with nylon. Sterile dressings were applied. The patient was awakened from anesthesia and transferred to the recovery room in stable condition with stable vital signs.   COMPLICATIONS: None apparent.   DRAINS: None.   SPECIMENS: None.   ESTIMATED BLOOD LOSS: 5 mL.   TOURNIQUET: No tourniquet was placed.  POSTOPERATIVE PLAN: The patient will be weightbearing as tolerated, range of motion as tolerated, can shower on postoperative day #3. No submerging the wounds. No chemical DVT prophylaxis. Follow up in my Orthopedic Clinic in 1 week time. No running, jumping, pounding sports for 6 weeks.

## 2021-12-22 NOTE — DISCHARGE INSTRUCTIONS
ARTHROSCOPY, DEBRIDEMENT, MENISECTOMY, OR CHONDROPLASTY POST OPERATIVE INSTRUCTIONS     WBAT WITHOUT BRACE PROTOCOL      FOLLOW UP APPOINTMENT  A follow-up appointment with Dr. Lester should be scheduled approximately one to two weeks after surgery. If your appointment was not scheduled prior to surgery, please call [phone].    Your follow up appointment will be at the location that you regularly see Dr. Lester:    [facility]  [address], [place] [address] [phone]    [address] [address]  [phone]    Physical therapy:   Physical therapy should begin one week after surgery. An order for physical therapy will be provided by Dr. Lester's office but it will be your responsibility to schedule the first appointment at the location of your choice.     ACTIVITY  Weight bearing status:   You will be allowed to weight bear as tolerated on your operative leg using assistive devices (crutches) as needed.     Exercises:   Perform the following exercises at least three times per day for the first four weeks after surgery to prevent complications, such as blood clots in your legs:  1) Point and flex your feet  2) Move your ankle around in big circles  3) Wiggle your toes   Also, perform thigh muscle tightening exercises for 10 to 15 minutes at least three times per day for the first four weeks after surgery.    Athletic Activities:  Activities such as swimming, bicycling, jogging, running, and stop-and-go sports should be avoided until permitted by your provider.    Driving:  You may return to driving once the following criteria have been met: 1) all narcotic pain medication has been discontinued, 2) you have full control over both lower extremities. Please contact Dr. Lester's office with any questions.    Return to Work:  Return to work as soon as possible.  Your  ability to work depends on a number of factors - your level of discomfort and how much demand your job puts on your knees.  If you have any questions, please call Dr. Lester's office.      COMFORT AND PAIN MANAGEMENT  Elevation:   During times of inactivity throughout the first two weeks after surgery, make an effort to decrease swelling by elevating your operative extremity. This is most effectively done by lying down and placing several pillows lengthwise under your thigh and calf to raise your toes above the level of your nose. To ensure that your knee remains in full extension, do not place pillows directly under your knee.     Icing:  An ice pack will be provided to control swelling and discomfort after surgery. Place a thin towel on your skin and apply the ice pack overtop. You may apply ice for 20 minutes as often as two times per hour.    Pain Medications:  You will be discharged with acetaminophen (Tylenol) and a narcotic medication for pain management after surgery. Acetaminophen is most effective when it is taken per the schedule outlined by your provider (every four, six, or eight hours as prescribed). You may safely use acetaminophen as prescribed for the first four weeks after surgery provided you do not exceed the maximum daily dose prescribed by your provider (usually 3000 mg - 4000 mg). The narcotic pain medication should only be taken on an as-needed basis when necessary and should be reserved for severe pain that is not controlled with scheduled acetaminophen. In the first three days following surgery, your symptoms may warrant use of the narcotic pain medication every three, four, or six hours as prescribed. After three days, focus your efforts on decreasing (tapering) use of narcotic medications.   The most successful tapering strategy is to first, decrease the dose (number of tablets) and second, increase the interval (time in between doses). For example, if you begin taking two tablets every  four hours after surgery, start your taper by decreasing one of these doses to one tablet. Every one to two days, decrease another dose to one tablet until you are eventually taking one tablet every four hours. Once this is achieved, focus on increasing the number of hours between doses, moving from one tablet every four hours to one tablet every six hours. As tolerated, continue to increase the interval to eight and twelve hours. Eventually, taper to one dose every evening and discontinue when no longer needed.      ANTICOAGULATION  Depending on your risk factors, your provider may prescribe aspirin to prevent blood clots. If prescribed, take aspirin daily for the first four weeks after surgery.      WOUND CARE AND SHOWERING  Wound care:  Keep the initial post-op dressing on, clean, and dry for the first three days after surgery. On the fourth day after surgery, you may remove the dressing. Your surgical incisions were closed with steristrips (small white tape that is directly on the incision areas) that should be left on until they fall off or are removed at your first office visit. All sutures will also be removed at your first office visit. Under no circumstance should you pick or scratch your incision.    Showering:  You may shower on the fourth day after surgery (immediatly after the dressing is removed) provided your incision is intact and dry without drainage. If there is fluid at the incision site, cover the incision with a non-permeable plastic bag. You may allow water to run over the incision, but do not soak or submerge the incision. Do not scrub the incision.     Tub Bathing:  Tub bathing, swimming, or any other activities that cause your incision to be submerged should be avoided until allowed by your provider. Typically, patients are allowed to return to these activities four weeks after surgery.      CONTACTING YOUR PHYSICIAN:  You may experience symptoms that require follow-up before your scheduled  "appointment. Please contact Dr. Lester's office if you experience:  1) Pain in your knee that persists or worsens in the first few days after surgery  2) Excessive redness or drainage of cloudy or bloody material from the wounds (clear red tinted fluid and some mild drainage should be expected) or drainage of any kind five days after surgery  3) A temperature elevation greater than 101.5 F   4) Pain, swelling or redness in your calf  5) Numbness or weakness in your leg or foot      Regular business hours (Monday - Friday, 8am - 5pm):  [facility]: [phone]  [facility]: [phone]    After hours and weekends:  [facility] on call Orthopedic resident: [phone]              Information about liposomal bupivacaine (Exparel)    What is Liposomal Bupivacaine?    Liposomal Bupivacaine is a numbing medication that can help you manage your pain after surgery.  This medication is similar to \"novacaine,\" which is often used by the dentist.  Liposomal bupivacaine is released slowly and can help control pain for up to 72 hours.    What is the purpose of Liposomal Bupivacaine?    To manage your pain after surgery    To help you sleep better, take deep breaths, walk more comfortable, and feel up to visiting with others    How is the procedure done?    Liposomal bupivacaine is a medication given by an injection.    It is usually given right before your surgery.  If this is the case, you will be awake or sedated, but you should experience minimal pain during the procedure.    For some people, the injection may be given at the very end of your surgery.  It all depends on the type of surgery and your situation.    The procedure usually takes about 5-15 minutes.  An ultrasound machine will help the anesthesiologist insert it in the right place or the surgeon will inject it under direct vision.     A needle is used to " place the numbing medication under your skin.  It provides pain relief by numbing the tissue in the area where your surgeon will make the incision.    What can I expect?    You may experience numbness, tingling, or a feeling of heaviness around the area that was injected.    If you experience any of the follow symptoms IMMEDIATELY CALL THE REGIONAL ANESTHESIA PAIN SERVICE:    Numbness or tingling occurs in areas other than around the injection site    Blurry vision    Ringing in your ears    A metallic taste in your mouth    PAGE: Dial [phone].  When prompted, enter the following 4-digit ID number:  [id number].  You will be prompted to enter your phone number; and then enter the # sign.  The clinician on call will call you back.    OR    CALL: Dial [phone].  Let the hospital  know that you are having a problem with a nerve block and that you would like to speak to the regional anesthesia pain service right away.    You should not receive any other type of numbing medication within 4 days after receiving liposomal bupivacaine unless your anesthesiologist approves.    Post Operative Instructions: Regional Anesthetic for Lower Extremity with Liposomal Bupivacaine  General Information:   Regional anesthesia is when local anesthetic or  numbing  medication is injected around the nerves to anesthetize or  numb  the area supplied by that set of nerves. It is a type of analgesia used to control pain and decreases the need for narcotics following surgery.    Types of Regional Blocks:  Femoral: A block injected into the groin area of the operative leg of a patient having thigh or knee surgery.  Adductor Canal: A block injected into the mid thigh of the operative leg of a patient having knee or ankle surgery.  Popliteal or Distal Sciatic: A block injected into the back of the knee of the operative leg of patients having foot or ankle surgery.   Ankle:  An anesthetic medication is injected into the ankle of the  operative leg of a patient having foot or toe surgery.     Procedure:   The type of anesthesia your doctor used to numb your leg will usually not wear off for 24-48 hours, but may last as long as 72 hours. You should be careful during that period, since it is possible to injure your leg without being aware of the injury. While your leg is numb you should:    Use crutches (minimal weight bearing until your motor and strength is completely back to normal)    Avoid striking or bumping your leg    Avoid extreme hot or cold    Discomfort:  You will have a tingling and prickly sensation in your leg as the feeling begins to return; you can also expect some discomfort. The amount of discomfort is unpredictable, but if you have more pain than can be controlled with pain medication, you should notify your physician.     Pain Medicine:   Begin taking your oral pain pills before bedtime and during the night to avoid a sudden onset of pain as part of the block wears off. Do not engage in drinking, driving, or hazardous occupations while taking pain medication.             [facility]  Home Care Following Anesthesia  For 24 hours after surgery:  1. Get plenty of rest.  A responsible adult must stay with you for at least 24 hours after you leave the surgery center.  2. Do not drive or use heavy equipment.  If you have weakness or tingling, don't drive or use heavy equipment until this feeling goes away.   3. Do not drink alcohol.   4. Avoid strenuous or risky activities.  Ask for help when climbing stairs.  5. You may feel lightheaded.  IF so, sit for a few minutes before standing.  Have someone help you get up.   6. If you have nausea (feel sick to your stomach): Drink only clear liquids such as apple juice, ginger ale, broth or 7-Up.  Rest may also help.  Be sure to drink enough fluids.  Move to a regular diet as you feel able.   7. You may have a slight fever.  Call the doctor if your fever  "is over 100 F (37.7 C) (taken under the tongue) or lasts longer than 24 hours.  8. You may have a dry mouth, a sore throat, muscle aches or trouble sleeping. These should go away after 24 hours.  9. Do not make important or legal decisions.   10. It is recommended to avoid smoking.        Today you received an Exparel block to numb the nerves near your surgery site.  This is a block using local anesthetic or \"numbing\" medication injected around the nerves to anesthetize or \"numb\" the area supplied by those nerves.  This block is injected into the muscle layer near your surgical site.  This medication may numb the location where you had surgery up to 72 hours.  If your surgical site is an arm or leg you should be careful with your affected limb, since it is possible to injure your limb without being aware of it due to the numbing.  Until full feeling returns, you should guard against bumping or hitting your limb, and avoid extreme hot or cold temperatures on the skin.  As the block wears off, the feeling will return as a tingling or prickly sensation near your surgical site.  You will experince more discomfort from your incision as the feeling returns.  You may want to take a pain pill (a narcotic or Tylenol if this was prescribed by your surgeon) when you start to experience mild pain before the pain beomes more severe.  If your pain medications do not control your pain, you should notify your surgeon.    Tips for taking pain medications  To get the best pain relief possible, remember these points:    Take pain medications as directed, before pain becomes severe.    Pain medication can upset your stomach: taking it with food may help.    Constipation is a common side effect of pain medication. Drink plenty of  fluids.    Eat foods high in fiber. Take a stool softener if recommended by your doctor or pharmacist.    Do not drink alcohol, drive or operate machinery while taking pain medications.    Ask about other ways " to control pain, such as with heat, ice or relaxation.    Tylenol/Acetaminophen Consumption  To help encourage the safe use of acetaminophen, the makers of TYLENOL  have lowered the maximum daily dose for single-ingredient Extra Strength TYLENOL  (acetaminophen) products sold in the [place] from 8 pills per day (4,000 mg) to 6 pills per day (3,000 mg). The dosing interval has also changed from 2 pills every 4-6 hours to 2 pills every 6 hours.    If you feel your pain relief is insufficient, you may take Tylenol/Acetaminophen in addition to your narcotic pain medication.     Be careful not to exceed 3,000 mg of Tylenol/Acetaminophen in a 24 hour period from all sources.    If you are taking extra strength Tylenol/acetaminophen (500 mg), the maximum dose is 6 tablets in 24 hours.    If you are taking regular strength acetaminophen (325 mg), the maximum dose is 9 tablets in 24 hours.    975 mg of Tylenol given at 1 pm, next dose ok to take at 7 pm then follow bottle instructions.    Call a doctor for any of the followin. Signs of infection (fever, growing tenderness at the surgery site, a large amount of drainage or bleeding, severe pain, foul-smelling drainage, redness, swelling).  2. It has been over 8 to 10 hours since surgery and you are still not able to urinate (pass water).  3. Headache for over 24 hours.  4. Numbness, tingling or weakness the day after surgery (if you had spinal anesthesia).  5. Signs of Covid-19 infection (temperature over 100 degrees, shortness of breath, cough, loss of taste/smell, generalized body aches, persistent headache, chills, sore throat, nausea/vomiting/diarrhea)  Your doctor is:       Dr. Barry Lester, Orthopaedics: [phone]               After hours and weekends dial [phone] and ask for the resident on call for:  Orthopaedics  For emergency care, call the:  [facility] Emergency Department: [phone] (TTY for hearing impaired: [phone])

## 2021-12-22 NOTE — ANESTHESIA PREPROCEDURE EVALUATION
Anesthesia Pre-Procedure Evaluation    Patient: Gatito Collier   MRN: [id number] : 1965        Preoperative Diagnosis: Acute medial meniscus tear, left, initial encounter [S83.242A]    Procedure : Procedure(s):  Left Knee Examination under anesthesia, knee arthroscopy, meniscectomy          Past Medical History:   Diagnosis Date     Allergic rhinitis, cause unspecified     Allergic rhinitis     Benign neoplasm of skin, site unspecified     dysplastic nevi     Calculus of gallbladder without mention of cholecystitis or obstruction     Cholelithiasis     Unspecified disease of pancreas ?    Pancreatitis/ Gall stone      Past Surgical History:   Procedure Laterality Date     COLONOSCOPY N/A 2017    Procedure: COMBINED COLONOSCOPY, SINGLE OR MULTIPLE BIOPSY/POLYPECTOMY BY BIOPSY;  colonoscopy. POlypectomy per forceps;  Surgeon: Michael Kunz MD;  Location: [facility]     HC EXC BENIGN SKIN LESION TRUNK/ARM/LEG 2.1-3.0 CM      birthmark removal, Left calf - approx age 4     HC REMOVE TONSILS/ADENOIDS,<13 Y/O      T & A <12y.o. (approx age 3)      Allergies   Allergen Reactions     No Known Drug Allergies       Social History     Tobacco Use     Smoking status: Never Smoker     Smokeless tobacco: Never Used   Substance Use Topics     Alcohol use: Yes     Alcohol/week: 3.3 standard drinks      Wt Readings from Last 1 Encounters:   21 83.9 kg (185 lb)        Anesthesia Evaluation   Pt has had prior anesthetic. Type: General.    No history of anesthetic complications       ROS/MED HX  ENT/Pulmonary:  - neg pulmonary ROS     Neurologic:  - neg neurologic ROS     Cardiovascular:  - neg cardiovascular ROS  (-) murmur   METS/Exercise Tolerance: >4 METS    Hematologic:  - neg hematologic  ROS     Musculoskeletal:  - neg musculoskeletal ROS     GI/Hepatic:  - neg GI/hepatic ROS     Renal/Genitourinary:  - neg Renal ROS     Endo:  - neg endo ROS     Psychiatric/Substance Use:  - neg psychiatric ROS      Infectious Disease:  - neg infectious disease ROS     Malignancy:  - neg malignancy ROS     Other:  - neg other ROS          Physical Exam    Airway        Mallampati: I   TM distance: > 3 FB   Neck ROM: full   Mouth opening: > 3 cm    Respiratory Devices and Support         Dental  no notable dental history         Cardiovascular          Rhythm and rate: regular and normal (-) no murmur    Pulmonary   pulmonary exam normal        breath sounds clear to auscultation           OUTSIDE LABS:  CBC:   Lab Results   Component Value Date    WBC 6.2 12/20/2021    WBC 9.7 05/21/2003    HGB 14.8 12/20/2021    HGB 15.7 01/06/2004    HCT 45.2 12/20/2021    HCT 47.7 05/21/2003     12/20/2021     05/21/2003     BMP:   Lab Results   Component Value Date     12/20/2021    POTASSIUM 4.2 12/20/2021    POTASSIUM 3.8 01/06/2004    CHLORIDE 106 12/20/2021    CO2 32 12/20/2021    BUN 15 12/20/2021    CR 0.87 12/20/2021     (H) 12/20/2021     COAGS: No results found for: PTT, INR, FIBR  POC: No results found for: BGM, HCG, HCGS  HEPATIC:   Lab Results   Component Value Date    ALBUMIN 4.2 05/21/2003    PROTTOTAL 7.5 05/21/2003    ALT 28 07/11/2018    AST 22 05/21/2003    ALKPHOS 87 05/21/2003    BILITOTAL 0.6 05/21/2003     OTHER:   Lab Results   Component Value Date    GERARDO 8.8 12/20/2021    LIPASE 85 05/21/2003    AMYLASE 52 05/21/2003    TSH 2.66 05/04/2001       Anesthesia Plan    ASA Status:  1   NPO Status:  NPO Appropriate    Anesthesia Type: General.     - Airway: LMA   Induction: Intravenous, Propofol.   Maintenance: Balanced.        Consents    Anesthesia Plan(s) and associated risks, benefits, and realistic alternatives discussed. Questions answered and patient/representative(s) expressed understanding.    - Discussed:     - Discussed with:  Patient      - Specific Concerns: complications of block including bleeding infection and damage to surrounding structures, post op nausea/pain, sore throat.      - Extended Intubation/Ventilatory Support Discussed: No.      - Patient is DNR/DNI Status: No    Use of blood products discussed: No .     Postoperative Care    Pain management: IV analgesics, Oral pain medications, Multi-modal analgesia, Peripheral nerve block (Single Shot).   PONV prophylaxis: Ondansetron (or other 5HT-3), Dexamethasone or Solumedrol, Background Propofol Infusion     Comments:                Zeeshan Morgan MD

## 2021-12-27 DIAGNOSIS — S83.242A ACUTE MEDIAL MENISCUS TEAR, LEFT, INITIAL ENCOUNTER: Primary | ICD-10-CM

## 2022-01-06 ENCOUNTER — OFFICE VISIT (OUTPATIENT)
Dept: ORTHOPEDICS | Facility: CLINIC | Age: 57
End: 2022-01-06
Payer: COMMERCIAL

## 2022-01-06 VITALS — WEIGHT: 185 LBS | BODY MASS INDEX: 25.06 KG/M2 | HEIGHT: 72 IN

## 2022-01-06 DIAGNOSIS — M25.562 ACUTE PAIN OF LEFT KNEE: ICD-10-CM

## 2022-01-06 PROCEDURE — 99024 POSTOP FOLLOW-UP VISIT: CPT | Performed by: ORTHOPAEDIC SURGERY

## 2022-01-06 ASSESSMENT — MIFFLIN-ST. JEOR: SCORE: 1707.15

## 2022-01-06 NOTE — LETTER
1/6/2022         RE: Gatito Collier  [address] [facility] [address]        Dear Colleague,    Thank you for referring your patient, Gatito Collier, to the [facility]. Please see a copy of my visit note below.    DIAGNOSIS:   1. Left medial meniscus tear    PROCEDURES:  1. Arthroscopic partial medial meniscectomy; date of surgery 12/22/2021    HISTORY:  Doing well 1 week out from surgery.  Pain controlled.  Off opioids.      EXAM:     General: Awake, Alert, and oriented. Articulates and communicates with a normal affect     Left lower Extremity:    Incisions well healed without evidence of infection    Normal post-operative effusion and ecchymosis    Range of motion and stability exam not performed    Neurovascularly intact    IMAGING:  No new imaging    ASSESSMENT:  1. 1 week following arthroscopic meniscectomy left knee.  Doing well.    PLAN:     Weightbearing as tolerated    Range of motion as tolerated    Sutures removed in clinic    Leave steri-strips in place until they fall off    OK to shower allowing water to run over incision    No soaking, scrubbing, baths, or lake for 1 additional week    Continue PT as scheduled     Pain medications reviewed and no refills required.     Operative report provided and arthroscopic images reviewed    Follow-up as needed        Again, thank you for allowing me to participate in the care of your patient.        Sincerely,        Barry Lester MD

## 2022-01-07 NOTE — PROGRESS NOTES
DIAGNOSIS:   1. Left medial meniscus tear    PROCEDURES:  1. Arthroscopic partial medial meniscectomy; date of surgery 12/22/2021    HISTORY:  Doing well 1 week out from surgery.  Pain controlled.  Off opioids.      EXAM:     General: Awake, Alert, and oriented. Articulates and communicates with a normal affect     Left lower Extremity:    Incisions well healed without evidence of infection    Normal post-operative effusion and ecchymosis    Range of motion and stability exam not performed    Neurovascularly intact    IMAGING:  No new imaging    ASSESSMENT:  1. 1 week following arthroscopic meniscectomy left knee.  Doing well.    PLAN:     Weightbearing as tolerated    Range of motion as tolerated    Sutures removed in clinic    Leave steri-strips in place until they fall off    OK to shower allowing water to run over incision    No soaking, scrubbing, baths, or lake for 1 additional week    Continue PT as scheduled     Pain medications reviewed and no refills required.     Operative report provided and arthroscopic images reviewed    Follow-up as needed

## 2022-01-28 DIAGNOSIS — S63.622S SPRAIN OF INTERPHALANGEAL JOINT OF LEFT THUMB, SEQUELA: Primary | ICD-10-CM

## 2022-01-28 DIAGNOSIS — S62.639G: ICD-10-CM

## 2022-01-28 NOTE — PROGRESS NOTES
Eric reports jamming his thumb 5-6 weeks ago and still experiencing limitations/discomfort, particularly the radial aspect of the IP joint.  Will order xray as next step to rule out chip fracture or other evidence of subluxation.   Electronically signed by Greg Schoen, MD

## 2022-01-29 ENCOUNTER — HOSPITAL ENCOUNTER (OUTPATIENT)
Dept: GENERAL RADIOLOGY | Facility: CLINIC | Age: 57
Discharge: HOME | End: 2022-01-29
Attending: FAMILY MEDICINE | Admitting: FAMILY MEDICINE
Payer: COMMERCIAL

## 2022-01-29 DIAGNOSIS — S63.622S SPRAIN OF INTERPHALANGEAL JOINT OF LEFT THUMB, SEQUELA: ICD-10-CM

## 2022-01-29 PROCEDURE — 73140 X-RAY EXAM OF FINGER(S): CPT | Mod: LT

## 2022-01-31 ENCOUNTER — HOSPITAL ENCOUNTER (OUTPATIENT)
Dept: OCCUPATIONAL THERAPY | Facility: CLINIC | Age: 57
Setting detail: THERAPIES SERIES
End: 2022-01-31
Attending: FAMILY MEDICINE
Payer: COMMERCIAL

## 2022-01-31 DIAGNOSIS — S63.622S SPRAIN OF INTERPHALANGEAL JOINT OF LEFT THUMB, SEQUELA: ICD-10-CM

## 2022-01-31 DIAGNOSIS — S62.639G: ICD-10-CM

## 2022-01-31 PROCEDURE — 97760 ORTHOTIC MGMT&TRAING 1ST ENC: CPT | Mod: GO | Performed by: OCCUPATIONAL THERAPIST

## 2022-01-31 PROCEDURE — 97165 OT EVAL LOW COMPLEX 30 MIN: CPT | Mod: GO | Performed by: OCCUPATIONAL THERAPIST

## 2022-01-31 NOTE — PROGRESS NOTES
"Occupational Therapy Evaluation       01/31/22 0745   Splint Fabrication   Type of Splint Left thumb spica hand based splint   Wearing schedule 4 weeks, then gentle ROM   Comments Peforated thermaplast, hand based thumb spica assessed and fabricated with 1 strap closure.  Wear and care instructions provided.   Minutes of treatment 30   General Information/History   Start Of Care Date 01/31/22   Referring Physician Dr Gregory Schoen   Orders Evaluate And Treat As Indicated;Other   Other Orders Molded thumb splint for left thumb avulsion, proximal aspect of distal phalanx.   Orders Date 01/28/22   Medical Diagnosis sprain IP joint of the left thumb S63.7463 and left closed fx of DIP of finger S62.639G   Additional Occupational Profile Info/Pertinent history of current problem The pt is a 55 y/o male who sustained injury to his left thumb when weight lifting and a bar bell \"bounced\" and struck the left thumb, with a jamming motion. He had continued to do his everyday activities, including playing sports.  He continues to have pain and swelling of the left thumb.  Xray, dx of closed avulsion fracture of the left thumb occurred on 1/28/22.   How/Where did it occur While carrying an object   Onset date of current episode/exacerbation 12/17/21   Chronicity New   Hand Dominance Right   Affected side Left   Functional limitations perform activities of daily living;perform required work activities;perform desired leisure / sports activities   Reported Symptoms Pain;Loss of Motion/Stiffness   Patient/Family goals statement Receive a thumb splint for work and sports   ROM   ROM AROM   AROM   Comments Left thumb IP and MP joint WFL   Sensation Findings   Sensation Findings Other (see comments)   Sensation Comments Pt did not report any left thumb numbing   Strength   Strength Other (see comments)   Strength Comments Not tested   Education Assessment   Preferred Learning Style Listening;Demonstration   Barriers to Learning No " barriers   Therapy Interventions   Planned Therapy Interventions Splinting;Education of splint wear, care, fit and precautions   Clinical Impression   Criteria for Skilled Therapeutic Interventions Met yes;treatment indicated   OT Diagnosis Pt has loss of functional use of the left thumb due to closed avulsion fracture; decreased pain free daily tasks,activities and routines.   Influenced by the following impairments Pain;Edema   Assessment of Occupational Performance 1-3 Performance Deficits   Identified Performance Deficits work, sports/exercise and home mgmt    Clinical Decision Making (Complexity) Low complexity   Therapy Frequency up to 3 sessions   Predicted Duration of Therapy Intervention (days/wks) 1 month   Risks and Benefits of Treatment have been explained. Yes   Patient, Family & other staff in agreement with plan of care Yes   Clinical Impression Comments The pt will beneft from immobilization of the left thumb, to allow healing and regain pain free functional use of the hand for work, home and sports daily activities.   Hand Eval Goals   Hand Eval Goals 1;2   Hand Goal 1   Goal Identifier splint   Goal Description The pt will understadn wear and care of the left thumb splint , wearing schedule 4 weeks as tolerated.   Target Date 02/28/22   Hand Goal 2   Goal Identifier AROM   Goal Description The pt will begin gentle AROM of the left thumb, in 4 weeks; for improved functional use of the left hand for BADL/IADLs.   Target Date 02/28/22   Total Evaluation Time   OT Eval, Low Complexity Minutes (56710) 30       Thank you for referring Eric  To OT services to assist with healing of the left thumb for BADL/IADLs.    If you have any questions or concerns, please contact me at [phone].    Marlen Garzon MA, OTR/[facility]  [email or web address]

## 2022-02-13 ENCOUNTER — HEALTH MAINTENANCE LETTER (OUTPATIENT)
Age: 57
End: 2022-02-13

## 2022-10-15 ENCOUNTER — HEALTH MAINTENANCE LETTER (OUTPATIENT)
Age: 57
End: 2022-10-15

## 2023-01-23 ENCOUNTER — OFFICE VISIT (OUTPATIENT)
Dept: DERMATOLOGY | Facility: CLINIC | Age: 58
End: 2023-01-23
Payer: COMMERCIAL

## 2023-01-23 DIAGNOSIS — Z86.018 HISTORY OF DYSPLASTIC NEVUS: ICD-10-CM

## 2023-01-23 DIAGNOSIS — L57.0 AK (ACTINIC KERATOSIS): Primary | ICD-10-CM

## 2023-01-23 DIAGNOSIS — L82.1 SK (SEBORRHEIC KERATOSIS): ICD-10-CM

## 2023-01-23 PROCEDURE — 17000 DESTRUCT PREMALG LESION: CPT | Mod: XS | Performed by: DERMATOLOGY

## 2023-01-23 PROCEDURE — 99213 OFFICE O/P EST LOW 20 MIN: CPT | Mod: 25 | Performed by: DERMATOLOGY

## 2023-01-23 PROCEDURE — 17110 DESTRUCTION B9 LES UP TO 14: CPT | Performed by: DERMATOLOGY

## 2023-01-23 PROCEDURE — 88305 TISSUE EXAM BY PATHOLOGIST: CPT | Performed by: DERMATOLOGY

## 2023-01-23 PROCEDURE — 88341 IMHCHEM/IMCYTCHM EA ADD ANTB: CPT | Performed by: DERMATOLOGY

## 2023-01-23 PROCEDURE — 88342 IMHCHEM/IMCYTCHM 1ST ANTB: CPT | Performed by: DERMATOLOGY

## 2023-01-23 ASSESSMENT — PAIN SCALES - GENERAL: PAINLEVEL: NO PAIN (0)

## 2023-01-23 NOTE — PROGRESS NOTES
[facility] Dermatology Note  Encounter Date: Jan 23, 2023  Office Visit     Dermatology Problem List:  1.History of Dysplastic Nevus  - Bx 9/3/19, L Forearm - Mild Atypia  - Bx 1/13/17, L Paraspinal Back - Compound nevus with mild dysplasia  - Bx 1/23/15, L Lower back - Junctional dysplastic nevus with mild atypia - narrowly excised  - Bx 5/19/15, Mid Back - Compound dysplastic nevus with mild atypia  - Bx 5/19/15, R Lower Back - Compound dysplastic nevus with mild to moderate atypia  - Bx 5/19/15, Central Abdomen - Compound dysplastic nevus with mild to moderate atypia - Exc. for recurrent pigment 2/2/2016  - Bx 10/21/14, Left Chest - Compound dysplastic nevus with severe atypia - Exc. 1/13/2015  2. Lesion on left calf excised as a child, approximately age 4  3. AK left nasal tip s/p cryo 1/9/2020    ____________________________________________    Assessment & Plan:    # History of dysplastic nevi, no clinical evidence of recurrence.  - ABCDEs: Counseled ABCDEs of melanoma: Asymmetry, Border (irregularity), Color (not uniform, changes in color), Diameter (greater than 6 mm which is about the size of a pencil eraser), and Evolving (any changes in preexisting moles).  - Sun protection: Counseled SPF30+ sunscreen, UPF clothing, sun avoidance, tanning bed avoidance.  - Recommended regular skin checks.     # Actnic keratoses, nose -ak, cryo    #Multiple benign nevi, trunk and extremities  -ABCDEs as above    #Sk, catches on mask, left cheek- wants cryo      Procedures Performed:   - Cryotherapy procedure note, location(s): left nose and left cheek. After verbal consent and discussion of risks and benefits including, but not limited to, dyspigmentation/scar, blister, and pain, 1  lesion(s) was(were) treated with 1-2 mm freeze border for 1-2 cycles with liquid nitrogen. Post cryotherapy instructions were provided.         Follow-up: 12 month(s) in-person, or earlier for new or changing lesions    Staff  and Scribe:     Scribe Disclosure:   I, Nathan Briggs, am serving as a scribe to document services personally performed by this physician, Dr. Marisela Juarez, based on data collection and the provider's statements to me.       Provider Disclosure:   The documentation recorded by the scribe accurately reflects the services I personally performed and the decisions made by me.    Marisela Juarez MD    Department of Dermatology  [facility]: Phone: [phone], Fax:[phone]  [facility]: Phone: [phone], Fax: [phone]    ____________________________________________    CC: Skin Check (Areas of concern: nose and left cheek- had cryo done last appointment- wanting this again today)    HPI:  Mr. Gatito Collier is a(n) 57 year old male who presents today as a return patient for spots on the nose and left cheek.     Last seen 1/9/20 by Dr. Benites for a skin check. At that time, 1 AK on the left nasal tip was treated with cryotherapy.     Today,  Wants spot rubbing on cheek treated. Also patient with nose lesion returned    Patient is otherwise feeling well, without additional skin concerns.    Labs Reviewed:  N/A    Physical Exam:  Vitals: There were no vitals taken for this visit.  SKIN: Total skin excluding the undergarment areas was performed. The exam included the head/face, neck, both arms, chest, back, abdomen, both legs, digits and/or nails.   - There are well circumscribed, symmetric tan to brown pigmented macules and papules on the trunk and extremities and well healed scars with no repigmetnation  -nose erythematous macule with overlying adherent scale on left nose  - stuck on waxy papule on left cheek  - No other lesions of concern on areas examined.     Medications:  Current Outpatient Medications   Medication     fluticasone (FLONASE) 50 MCG/ACT nasal spray     loratadine  (CLARITIN) 10 MG tablet     Multiple Vitamins-Minerals (CENTRUM SILVER) per tablet     Multiple Vitamins-Minerals (PRESERVISION AREDS 2) CAPS     No current facility-administered medications for this visit.      Past Medical History:   Patient Active Problem List   Diagnosis     Allergic rhinitis     Benign neoplasm of skin     CARDIOVASCULAR SCREENING; LDL GOAL LESS THAN 160     History of dysplastic nevus     Acute medial meniscus tear, left, initial encounter     Past Medical History:   Diagnosis Date     Allergic rhinitis, cause unspecified     Allergic rhinitis     Benign neoplasm of skin, site unspecified     dysplastic nevi     Calculus of gallbladder without mention of cholecystitis or obstruction     Cholelithiasis     Unspecified disease of pancreas 2/00?    Pancreatitis/ Gall stone        CC No referring provider defined for this encounter. on close of this encounter.

## 2023-01-23 NOTE — NURSING NOTE
Gatito Collier's chief complaint for this visit includes:  Chief Complaint   Patient presents with     Skin Check     Areas of concern: nose and left cheek- had cryo done last appointment- wanting this again today     PCP: Schoen, Gregory G    Referring Provider:  No referring provider defined for this encounter.    There were no vitals taken for this visit.  No Pain (0)        Allergies   Allergen Reactions     No Known Drug Allergies          Do you need any medication refills at today's visit?    No

## 2023-01-23 NOTE — LETTER
1/23/2023         RE: Gatito Collier  [address] [facility] [address]        Dear Colleague,    Thank you for referring your patient, Gatito Collier, to the [facility]. Please see a copy of my visit note below.    [facility] Dermatology Note  Encounter Date: Jan 23, 2023  Office Visit     Dermatology Problem List:  1.History of Dysplastic Nevus  - Bx 9/3/19, L Forearm - Mild Atypia  - Bx 1/13/17, L Paraspinal Back - Compound nevus with mild dysplasia  - Bx 1/23/15, L Lower back - Junctional dysplastic nevus with mild atypia - narrowly excised  - Bx 5/19/15, Mid Back - Compound dysplastic nevus with mild atypia  - Bx 5/19/15, R Lower Back - Compound dysplastic nevus with mild to moderate atypia  - Bx 5/19/15, Central Abdomen - Compound dysplastic nevus with mild to moderate atypia - Exc. for recurrent pigment 2/2/2016  - Bx 10/21/14, Left Chest - Compound dysplastic nevus with severe atypia - Exc. 1/13/2015  2. Lesion on left calf excised as a child, approximately age 4  3. AK left nasal tip s/p cryo 1/9/2020    ____________________________________________    Assessment & Plan:    # History of dysplastic nevi, no clinical evidence of recurrence.  - ABCDEs: Counseled ABCDEs of melanoma: Asymmetry, Border (irregularity), Color (not uniform, changes in color), Diameter (greater than 6 mm which is about the size of a pencil eraser), and Evolving (any changes in preexisting moles).  - Sun protection: Counseled SPF30+ sunscreen, UPF clothing, sun avoidance, tanning bed avoidance.  - Recommended regular skin checks.     # Actnic keratoses, nose -ak, cryo    #Multiple benign nevi, trunk and extremities  -ABCDEs as above    #Sk, catches on mask, left cheek- wants cryo      Procedures Performed:   - Cryotherapy procedure note, location(s): left nose and left cheek. After verbal consent and discussion of risks and benefits including, but not limited to,  dyspigmentation/scar, blister, and pain, 1  lesion(s) was(were) treated with 1-2 mm freeze border for 1-2 cycles with liquid nitrogen. Post cryotherapy instructions were provided.         Follow-up: 12 month(s) in-person, or earlier for new or changing lesions    Staff and Scribe:     Scribe Disclosure:   I, Nathan Briggs, am serving as a scribe to document services personally performed by this physician, Dr. Marisela Juarez, based on data collection and the provider's statements to me.       Provider Disclosure:   The documentation recorded by the scribe accurately reflects the services I personally performed and the decisions made by me.    Marisela Juarez MD    Department of Dermatology  [facility]: Phone: [phone], Fax:[phone]  [facility]: Phone: [phone], Fax: [phone]    ____________________________________________    CC: Skin Check (Areas of concern: nose and left cheek- had cryo done last appointment- wanting this again today)    HPI:  Mr. Gatito Collier is a(n) 57 year old male who presents today as a return patient for spots on the nose and left cheek.     Last seen 1/9/20 by Dr. Benites for a skin check. At that time, 1 AK on the left nasal tip was treated with cryotherapy.     Today,  Wants spot rubbing on cheek treated. Also patient with nose lesion returned    Patient is otherwise feeling well, without additional skin concerns.    Labs Reviewed:  N/A    Physical Exam:  Vitals: There were no vitals taken for this visit.  SKIN: Total skin excluding the undergarment areas was performed. The exam included the head/face, neck, both arms, chest, back, abdomen, both legs, digits and/or nails.   - There are well circumscribed, symmetric tan to brown pigmented macules and papules on the trunk and extremities and well healed scars with no repigmetnation  -nose  erythematous macule with overlying adherent scale on left nose  - stuck on waxy papule on left cheek  - No other lesions of concern on areas examined.     Medications:  Current Outpatient Medications   Medication     fluticasone (FLONASE) 50 MCG/ACT nasal spray     loratadine (CLARITIN) 10 MG tablet     Multiple Vitamins-Minerals (CENTRUM SILVER) per tablet     Multiple Vitamins-Minerals (PRESERVISION AREDS 2) CAPS     No current facility-administered medications for this visit.      Past Medical History:   Patient Active Problem List   Diagnosis     Allergic rhinitis     Benign neoplasm of skin     CARDIOVASCULAR SCREENING; LDL GOAL LESS THAN 160     History of dysplastic nevus     Acute medial meniscus tear, left, initial encounter     Past Medical History:   Diagnosis Date     Allergic rhinitis, cause unspecified     Allergic rhinitis     Benign neoplasm of skin, site unspecified     dysplastic nevi     Calculus of gallbladder without mention of cholecystitis or obstruction     Cholelithiasis     Unspecified disease of pancreas 2/00?    Pancreatitis/ Gall stone        CC No referring provider defined for this encounter. on close of this encounter.     The following medication was given:     MEDICATION:  Lidocaine   ROUTE: SQ  SITE: see procedure note  DOSE: .5cc  LOT #: [id number]  : [employer]  EXPIRATION DATE: 09/01/23  NDC#: [address]-78  Was there drug waste? Yes, .5cc  Multi-dose vial: Yes    Eli Robison RN  January 23, 2023      Again, thank you for allowing me to participate in the care of your patient.        Sincerely,        Marisela Juarez MD

## 2023-01-23 NOTE — PATIENT INSTRUCTIONS
Cryotherapy    What is it?  Use of a very cold liquid, such as liquid nitrogen, to freeze and destroy abnormal skin cells that need to be removed    What should I expect?  Tenderness and redness  A small blister that might grow and fill with dark purple blood. There may be crusting.  More than one treatment may be needed if the lesions do not go away.    How do I care for the treated area?  Gently wash the area with your hands when bathing.  Use a thin layer of Vaseline to help with healing. You may use a Band-Aid.   The area should heal within 7-10 days and may leave behind a pink or lighter color.   Do not use an antibiotic or Neosporin ointment.   You may take acetaminophen (Tylenol) for pain.     Call your doctor if you have:  Severe pain  Signs of infection (warmth, redness, cloudy yellow drainage, and or a bad smell)  Questions or concerns    Who should I call with questions?      [facility]: [phone]      [facility]: [phone]      For urgent needs outside of business hours call the [facility] at [phone] and ask for the dermatology resident on call

## 2023-01-23 NOTE — PROGRESS NOTES
The following medication was given:     MEDICATION:  Lidocaine   ROUTE: SQ  SITE: see procedure note  DOSE: .5cc  LOT #: [id number]  : [employer]  EXPIRATION DATE: 09/01/23  NDC#: [address]-35  Was there drug waste? Yes, .5cc  Multi-dose vial: Yes    Eli Robison RN  January 23, 2023

## 2023-01-26 LAB
PATH REPORT.COMMENTS IMP SPEC: NORMAL
PATH REPORT.FINAL DX SPEC: NORMAL
PATH REPORT.GROSS SPEC: NORMAL
PATH REPORT.MICROSCOPIC SPEC OTHER STN: NORMAL
PATH REPORT.RELEVANT HX SPEC: NORMAL

## 2023-03-26 ENCOUNTER — HEALTH MAINTENANCE LETTER (OUTPATIENT)
Age: 58
End: 2023-03-26

## 2023-03-30 ENCOUNTER — OFFICE VISIT (OUTPATIENT)
Dept: DERMATOLOGY | Facility: CLINIC | Age: 58
End: 2023-03-30
Payer: COMMERCIAL

## 2023-03-30 DIAGNOSIS — L57.0 ACTINIC KERATOSIS: ICD-10-CM

## 2023-03-30 DIAGNOSIS — L82.1 SEBORRHEIC KERATOSIS: ICD-10-CM

## 2023-03-30 DIAGNOSIS — Z86.018 HISTORY OF DYSPLASTIC NEVUS: Primary | ICD-10-CM

## 2023-03-30 PROCEDURE — 17000 DESTRUCT PREMALG LESION: CPT | Mod: XS | Performed by: DERMATOLOGY

## 2023-03-30 PROCEDURE — 17110 DESTRUCTION B9 LES UP TO 14: CPT | Performed by: DERMATOLOGY

## 2023-03-30 NOTE — LETTER
3/30/2023         RE: Gatito Collier  [address] [facility] [facility] [address]        Dear Colleague,    Thank you for referring your patient, Gatito Collier, to the [facility]. Please see a copy of my visit note below.    [facility] Dermatology Note  Encounter Date: Mar 30, 2023  Office Visit     Dermatology Problem List:  1.History of Dysplastic Nevus  - Bx 9/3/19, L Forearm - Mild Atypia  - Bx 1/13/17, L Paraspinal Back - Compound nevus with mild dysplasia  - Bx 1/23/15, L Lower back - Junctional dysplastic nevus with mild atypia - narrowly excised  - Bx 5/19/15, Mid Back - Compound dysplastic nevus with mild atypia  - Bx 5/19/15, R Lower Back - Compound dysplastic nevus with mild to moderate atypia  - Bx 5/19/15, Central Abdomen - Compound dysplastic nevus with mild to moderate atypia - Exc. for recurrent pigment 2/2/2016  - Bx 10/21/14, Left Chest - Compound dysplastic nevus with severe atypia - Exc. 1/13/2015  2. Lesion on left calf excised as a child, approximately age 4  3. AK - left nasal tip, s/p cryo 1/9/2020     ____________________________________________     Assessment & Plan:     # History of dysplastic nevi, no clinical evidence of recurrence.  - Recommended regular skin checks.     # Actinic keratosis - left nasal tip x 1. Previously treated with cryo, will treat residual lesion again today.  - See cryo procedure note.    # Seborrheic keratosis, symptomatic - left cheek x 1. Based on the location and chronic irritation to this lesion, treatment with cryotherapy is warranted.   - See cryo procedure note.     Procedures Performed:   - Cryotherapy procedure note, location(s): left cheek, left nasal tip. After verbal consent and discussion of risks and benefits including, but not limited to, dyspigmentation/scar, blister, and pain, 1 AK and 1 ISK(s) was(were) treated with 1-2 mm freeze border for 1-2 cycles with liquid nitrogen. Post  cryotherapy instructions were provided.      Follow-up: 1 year(s) in-person for a skin check, or earlier for new or changing lesions    Staff and Scribe:     Scribe Disclosure:   I, Nathan Israarabella, am serving as a scribe to document services personally performed by this physician, Dr. Marisela Juarez, based on data collection and the provider's statements to me.       Provider Disclosure:   The documentation recorded by the scribe accurately reflects the services I personally performed and the decisions made by me.    Marisela Juarez MD    Department of Dermatology  [facility]: Phone: [phone], Fax:[phone]  [facility]: Phone: [phone], Fax: [phone]    ____________________________________________    CC: Derm Problem (Patient in clinic for cryo therapy, area of concern nose and left cheek)    HPI:  Mr. Gatito Collier is a(n) 58 year old male who presents today as a return patient for a spot check.    Last seen 1/23/23 for a skin check. At that time, 1 AK and 1 inflamed SK were treated with cryotherapy.     Today, he is concerned about a spot on the nose that is improved from prior cryo but still present.  He also notes a spot on the left cheek that is also improved.    Patient is otherwise feeling well, without additional skin concerns.    Labs Reviewed:  N/A    Physical Exam:  Vitals: There were no vitals taken for this visit.  SKIN: Focused examination of nose, cheek was performed.  - There are tan to brown waxy stuck on papules with surrounding erythema on the left cheek.    - Faint scale on the left nasal tip.  - No other lesions of concern on areas examined.     Medications:  Current Outpatient Medications   Medication     fluticasone (FLONASE) 50 MCG/ACT nasal spray     loratadine (CLARITIN) 10 MG tablet     Multiple Vitamins-Minerals (PRESERVISION AREDS 2) CAPS      Multiple Vitamins-Minerals (CENTRUM SILVER) per tablet     No current facility-administered medications for this visit.      Past Medical History:   Patient Active Problem List   Diagnosis     Allergic rhinitis     Benign neoplasm of skin     CARDIOVASCULAR SCREENING; LDL GOAL LESS THAN 160     History of dysplastic nevus     Acute medial meniscus tear, left, initial encounter     Past Medical History:   Diagnosis Date     Allergic rhinitis, cause unspecified     Allergic rhinitis     Benign neoplasm of skin, site unspecified     dysplastic nevi     Calculus of gallbladder without mention of cholecystitis or obstruction     Cholelithiasis     History of dysplastic nevus      Unspecified disease of pancreas 2/00?    Pancreatitis/ Gall stone        CC No referring provider defined for this encounter. on close of this encounter.     Again, thank you for allowing me to participate in the care of your patient.        Sincerely,        Marisela Juarez MD

## 2023-03-30 NOTE — NURSING NOTE
Gatito Collier's goals for this visit include:   Chief Complaint   Patient presents with     Derm Problem     Patient in clinic for cryo therapy, area of concern nose and left cheek       He requests these members of his care team be copied on today's visit information:     PCP: Schoen, Gregory G    Referring Provider:  No referring provider defined for this encounter.    There were no vitals taken for this visit.    Do you need any medication refills at today's visit? No      Fay Logan CMA on 3/30/2023 at 1:56 PM

## 2023-03-30 NOTE — PROGRESS NOTES
[facility] Dermatology Note  Encounter Date: Mar 30, 2023  Office Visit     Dermatology Problem List:  1.History of Dysplastic Nevus  - Bx 9/3/19, L Forearm - Mild Atypia  - Bx 1/13/17, L Paraspinal Back - Compound nevus with mild dysplasia  - Bx 1/23/15, L Lower back - Junctional dysplastic nevus with mild atypia - narrowly excised  - Bx 5/19/15, Mid Back - Compound dysplastic nevus with mild atypia  - Bx 5/19/15, R Lower Back - Compound dysplastic nevus with mild to moderate atypia  - Bx 5/19/15, Central Abdomen - Compound dysplastic nevus with mild to moderate atypia - Exc. for recurrent pigment 2/2/2016  - Bx 10/21/14, Left Chest - Compound dysplastic nevus with severe atypia - Exc. 1/13/2015  2. Lesion on left calf excised as a child, approximately age 4  3. AK - left nasal tip, s/p cryo 1/9/2020     ____________________________________________     Assessment & Plan:     # History of dysplastic nevi, no clinical evidence of recurrence.  - Recommended regular skin checks.     # Actinic keratosis - left nasal tip x 1. Previously treated with cryo, will treat residual lesion again today.  - See cryo procedure note.    # Seborrheic keratosis, symptomatic - left cheek x 1. Based on the location and chronic irritation to this lesion, treatment with cryotherapy is warranted.   - See cryo procedure note.     Procedures Performed:   - Cryotherapy procedure note, location(s): left cheek, left nasal tip. After verbal consent and discussion of risks and benefits including, but not limited to, dyspigmentation/scar, blister, and pain, 1 AK and 1 ISK(s) was(were) treated with 1-2 mm freeze border for 1-2 cycles with liquid nitrogen. Post cryotherapy instructions were provided.      Follow-up: 1 year(s) in-person for a skin check, or earlier for new or changing lesions    Staff and Scribe:     Scribe Disclosure:   I, Nathan Briggs, am serving as a scribe to document services personally performed by this  physician, Dr. Marisela Juarez, based on data collection and the provider's statements to me.       Provider Disclosure:   The documentation recorded by the scribe accurately reflects the services I personally performed and the decisions made by me.    Marisela Juarez MD    Department of Dermatology  [facility]: Phone: [phone], Fax:[phone]  [facility]: Phone: [phone], Fax: [phone]    ____________________________________________    CC: Derm Problem (Patient in clinic for cryo therapy, area of concern nose and left cheek)    HPI:  Mr. Gatito Collier is a(n) 58 year old male who presents today as a return patient for a spot check.    Last seen 1/23/23 for a skin check. At that time, 1 AK and 1 inflamed SK were treated with cryotherapy.     Today, he is concerned about a spot on the nose that is improved from prior cryo but still present.  He also notes a spot on the left cheek that is also improved.    Patient is otherwise feeling well, without additional skin concerns.    Labs Reviewed:  N/A    Physical Exam:  Vitals: There were no vitals taken for this visit.  SKIN: Focused examination of nose, cheek was performed.  - There are tan to brown waxy stuck on papules with surrounding erythema on the left cheek.    - Faint scale on the left nasal tip.  - No other lesions of concern on areas examined.     Medications:  Current Outpatient Medications   Medication     fluticasone (FLONASE) 50 MCG/ACT nasal spray     loratadine (CLARITIN) 10 MG tablet     Multiple Vitamins-Minerals (PRESERVISION AREDS 2) CAPS     Multiple Vitamins-Minerals (CENTRUM SILVER) per tablet     No current facility-administered medications for this visit.      Past Medical History:   Patient Active Problem List   Diagnosis     Allergic rhinitis     Benign neoplasm of skin     CARDIOVASCULAR SCREENING; LDL GOAL  LESS THAN 160     History of dysplastic nevus     Acute medial meniscus tear, left, initial encounter     Past Medical History:   Diagnosis Date     Allergic rhinitis, cause unspecified     Allergic rhinitis     Benign neoplasm of skin, site unspecified     dysplastic nevi     Calculus of gallbladder without mention of cholecystitis or obstruction     Cholelithiasis     History of dysplastic nevus      Unspecified disease of pancreas 2/00?    Pancreatitis/ Gall stone        CC No referring provider defined for this encounter. on close of this encounter.

## 2023-03-30 NOTE — PATIENT INSTRUCTIONS
Cryotherapy    What is it?  Use of a very cold liquid, such as liquid nitrogen, to freeze and destroy abnormal skin cells that need to be removed    What should I expect?  Tenderness and redness  A small blister that might grow and fill with dark purple blood. There may be crusting.  More than one treatment may be needed if the lesions do not go away.    How do I care for the treated area?  Gently wash the area with your hands when bathing.  Use a thin layer of Vaseline to help with healing. You may use a Band-Aid.   The area should heal within 7-10 days and may leave behind a pink or lighter color.   Do not use an antibiotic or Neosporin ointment.   You may take acetaminophen (Tylenol) for pain.     Call your doctor if you have:  Severe pain  Signs of infection (warmth, redness, cloudy yellow drainage, and or a bad smell)  Questions or concerns    Who should I call with questions?      [facility]: [phone]      [facility]: [phone]      For urgent needs outside of business hours call the [facility] at [phone] and ask for the dermatology resident on call

## 2023-10-26 ENCOUNTER — TELEPHONE (OUTPATIENT)
Dept: FAMILY MEDICINE | Facility: CLINIC | Age: 58
End: 2023-10-26
Payer: COMMERCIAL

## 2023-10-26 NOTE — TELEPHONE ENCOUNTER
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  He sent me a text stating that he is ready to get it.  Thanks,    Electronically signed by:  Renard Maharaj M.D.  10/26/2023

## 2023-11-20 ENCOUNTER — ALLIED HEALTH/NURSE VISIT (OUTPATIENT)
Dept: FAMILY MEDICINE | Facility: CLINIC | Age: 58
End: 2023-11-20
Payer: COMMERCIAL

## 2023-11-20 DIAGNOSIS — Z23 ENCOUNTER FOR IMMUNIZATION: Primary | ICD-10-CM

## 2023-11-20 PROCEDURE — 99207 PR NO CHARGE NURSE ONLY: CPT

## 2023-11-20 PROCEDURE — 90471 IMMUNIZATION ADMIN: CPT

## 2023-11-20 PROCEDURE — 90750 HZV VACC RECOMBINANT IM: CPT

## 2023-11-20 NOTE — PROGRESS NOTES
Prior to immunization administration, verified patients identity using patient s name and date of birth. Please see Immunization Activity for additional information.     Screening Questionnaire for Adult Immunization    Are you sick today?   No   Do you have allergies to medications, food, a vaccine component or latex?   No   Have you ever had a serious reaction after receiving a vaccination?   No   Do you have a long-term health problem with heart, lung, kidney, or metabolic disease (e.g., diabetes), asthma, a blood disorder, no spleen, complement component deficiency, a cochlear implant, or a spinal fluid leak?  Are you on long-term aspirin therapy?   No   Do you have cancer, leukemia, HIV/AIDS, or any other immune system problem?   No   Do you have a parent, brother, or sister with an immune system problem?   No   In the past 3 months, have you taken medications that affect  your immune system, such as prednisone, other steroids, or anticancer drugs; drugs for the treatment of rheumatoid arthritis, Crohn s disease, or psoriasis; or have you had radiation treatments?   No   Have you had a seizure, or a brain or other nervous system problem?   No   During the past year, have you received a transfusion of blood or blood    products, or been given immune (gamma) globulin or antiviral drug?   No   For women: Are you pregnant or is there a chance you could become       pregnant during the next month?   No   Have you received any vaccinations in the past 4 weeks?   No     Immunization questionnaire answers were all negative.    I have reviewed the following standing orders:   This patient is due and qualifies for the Zoster vaccine.    Click here for Zoster Standing Order    I have reviewed the vaccines inclusion and exclusion criteria; No concerns regarding eligibility.     Patient instructed to remain in clinic for 15 minutes afterwards, and to report any adverse reactions.     Screening performed by Aurora  Gus on 11/20/2023 at 8:35 AM.

## 2023-12-15 ENCOUNTER — OFFICE VISIT (OUTPATIENT)
Dept: DERMATOLOGY | Facility: CLINIC | Age: 58
End: 2023-12-15
Payer: COMMERCIAL

## 2023-12-15 DIAGNOSIS — L57.0 ACTINIC KERATOSIS: ICD-10-CM

## 2023-12-15 DIAGNOSIS — L82.1 SEBORRHEIC KERATOSIS: ICD-10-CM

## 2023-12-15 DIAGNOSIS — Z86.018 HISTORY OF DYSPLASTIC NEVUS: Primary | ICD-10-CM

## 2023-12-15 PROCEDURE — 17000 DESTRUCT PREMALG LESION: CPT | Mod: XS | Performed by: DERMATOLOGY

## 2023-12-15 PROCEDURE — 17110 DESTRUCTION B9 LES UP TO 14: CPT | Performed by: DERMATOLOGY

## 2023-12-15 NOTE — LETTER
12/15/2023         RE: Gatito Collier  [address] [address]        Dear Colleague,    Thank you for referring your patient, Gatito Collier, to the [facility]. Please see a copy of my visit note below.    [facility] Dermatology Note  Encounter Date: Dec 15, 2023  Office Visit     Dermatology Problem List:  Last TBSE 12/15/23, recommend yearly    1.H/O DN  - Bx 9/3/19, L Forearm - Mild Atypia  - Bx 1/13/17, L Paraspinal Back - Compound nevus with mild dysplasia  - Bx 1/23/15, L Lower back - Junctional dysplastic nevus with mild atypia - narrowly excised  - Bx 5/19/15, Mid Back - Compound dysplastic nevus with mild atypia  - Bx 5/19/15, R Lower Back - Compound dysplastic nevus with mild to moderate atypia  - Bx 5/19/15, Central Abdomen - Compound dysplastic nevus with mild to moderate atypia - Exc. for recurrent pigment 2/2/2016  - Bx 10/21/14, Left Chest - Compound dysplastic nevus with severe atypia, s/p excision 1/13/15  2. Lesion on left calf excised as a child, approximately age 4  3. AK  - left nasal tip, s/p cryo 1/9/20 and 3/30/23  4. ISK  - left cheek x 1, s/p cryo 3/30/23    FHx: FM/ER physician.  ____________________________________________    Assessment & Plan:    # H/O DN. No clinical evidence of recurrence.  - ABCDEs: Counseled ABCDEs of melanoma: Asymmetry, Border (irregularity), Color (not uniform, changes in color), Diameter (greater than 6 mm which is about the size of a pencil eraser), and Evolving (any changes in preexisting moles).  - Sun protection: Counseled SPF30+ sunscreen, UPF clothing, sun avoidance, tanning bed avoidance.  - Recommend annual skin checks due to H/O AK.    # ISK - left cheek.  - Cryotherapy performed today. See procedure note below.     # AK - nasal tip.  - Cryotherapy performed today. See procedure note below.     Procedures Performed:   - Cryotherapy procedure note, location(s): see above.  After verbal consent and discussion of risks and benefits including, but not limited to, dyspigmentation/scar, blister, and pain, 2 lesion(s) was(were) treated with 1-2 mm freeze border for 1-2 cycles with liquid nitrogen. Post cryotherapy instructions were provided.    Follow-up: 1 year in-person for TBSE, or earlier for new or changing lesions    Staff and Scribe:     Scribe Disclosure:   I, Nessa Ryan, am serving as a scribe to document services personally performed by Marisela Juarez MD based on data collection and the provider's statements to me.      Provider Disclosure:   The documentation recorded by the scribe accurately reflects the services I personally performed and the decisions made by me.    Marisela Juarez MD    Department of Dermatology  [facility]: Phone: [phone], Fax:[phone]  [facility]: Phone: [phone], Fax: [phone]   ____________________________________________    CC: Skin Check (No hx of skin cancer; SK on L cheek, and AK on tip of nose, R infraorbital ridge )    HPI:  Mr. Gatito Collier is a(n) 58 year old male who presents today as a return patient for a skin check.    He is concerned with AK on the tip of his nose, along with an SK on his left cheek.      Also, there is a spot on the right infraorbital ridge.    Patient is otherwise feeling well, without additional skin concerns.    Labs Reviewed:  N/A    Physical Exam:  Vitals: There were no vitals taken for this visit.  SKIN: Total skin excluding the undergarment areas was performed. The exam included the head/face, neck, both arms, chest, back, abdomen, both legs, digits and/or nails.   - There is no erythema, telangectasias, nodularity, or pigmentation on the sites of prior DN.   - There is an erythematous macule with overyling adherent scale on the nasal tip.  - There is a tan to brown waxy  stuck on papule with surrounding erythema on the left cheek.   - No other lesions of concern on areas examined.     Medications:  Current Outpatient Medications   Medication     fluticasone (FLONASE) 50 MCG/ACT nasal spray     loratadine (CLARITIN) 10 MG tablet     Multiple Vitamins-Minerals (PRESERVISION AREDS 2) CAPS     No current facility-administered medications for this visit.      Past Medical History:   Patient Active Problem List   Diagnosis     Allergic rhinitis     Benign neoplasm of skin     CARDIOVASCULAR SCREENING; LDL GOAL LESS THAN 160     History of dysplastic nevus     Acute medial meniscus tear, left, initial encounter     Past Medical History:   Diagnosis Date     Allergic rhinitis, cause unspecified     Allergic rhinitis     Benign neoplasm of skin, site unspecified     dysplastic nevi     Calculus of gallbladder without mention of cholecystitis or obstruction     Cholelithiasis     History of dysplastic nevus      Unspecified disease of pancreas 2/00?    Pancreatitis/ Gall stone        CC No referring provider defined for this encounter. on close of this encounter.      Again, thank you for allowing me to participate in the care of your patient.        Sincerely,        Marisela Juarez MD

## 2023-12-15 NOTE — NURSING NOTE
Gatito Collier's chief complaint for this visit includes:  Chief Complaint   Patient presents with    Skin Check     No hx of skin cancer; SK on L cheek, and AK on tip of nose, R infraorbital ridge      PCP: Schoen, Gregory G    Referring Provider:  No referring provider defined for this encounter.    There were no vitals taken for this visit.  Data Unavailable        Allergies   Allergen Reactions    No Known Drug Allergy          Do you need any medication refills at today's visit? No

## 2023-12-15 NOTE — PATIENT INSTRUCTIONS
Cryotherapy    What is it?  Use of a very cold liquid, such as liquid nitrogen, to freeze and destroy abnormal skin cells that need to be removed    What should I expect?  Tenderness and redness  A small blister that might grow and fill with dark purple blood. There may be crusting.  More than one treatment may be needed if the lesions do not go away.    How do I care for the treated area?  Gently wash the area with your hands when bathing.  Use a thin layer of Vaseline to help with healing. You may use a Band-Aid.   The area should heal within 7-10 days and may leave behind a pink or lighter color.   Do not use an antibiotic or Neosporin ointment.   You may take acetaminophen (Tylenol) for pain.     Call your doctor if you have:  Severe pain  Signs of infection (warmth, redness, cloudy yellow drainage, and or a bad smell)  Questions or concerns    Who should I call with questions?      [facility]: [phone]      [facility]: [phone]      For urgent needs outside of business hours call the [facility] at [phone] and ask for the dermatology resident on call

## 2023-12-15 NOTE — PROGRESS NOTES
[facility] Dermatology Note  Encounter Date: Dec 15, 2023  Office Visit     Dermatology Problem List:  Last TBSE 12/15/23, recommend yearly    1.H/O DN  - Bx 9/3/19, L Forearm - Mild Atypia  - Bx 1/13/17, L Paraspinal Back - Compound nevus with mild dysplasia  - Bx 1/23/15, L Lower back - Junctional dysplastic nevus with mild atypia - narrowly excised  - Bx 5/19/15, Mid Back - Compound dysplastic nevus with mild atypia  - Bx 5/19/15, R Lower Back - Compound dysplastic nevus with mild to moderate atypia  - Bx 5/19/15, Central Abdomen - Compound dysplastic nevus with mild to moderate atypia - Exc. for recurrent pigment 2/2/2016  - Bx 10/21/14, Left Chest - Compound dysplastic nevus with severe atypia, s/p excision 1/13/15  2. Lesion on left calf excised as a child, approximately age 4  3. AK  - left nasal tip, s/p cryo 1/9/20 and 3/30/23  4. ISK  - left cheek x 1, s/p cryo 3/30/23    FHx: FM/ER physician.  ____________________________________________    Assessment & Plan:    # H/O DN. No clinical evidence of recurrence.  - ABCDEs: Counseled ABCDEs of melanoma: Asymmetry, Border (irregularity), Color (not uniform, changes in color), Diameter (greater than 6 mm which is about the size of a pencil eraser), and Evolving (any changes in preexisting moles).  - Sun protection: Counseled SPF30+ sunscreen, UPF clothing, sun avoidance, tanning bed avoidance.  - Recommend annual skin checks due to H/O AK.    # ISK - left cheek.  - Cryotherapy performed today. See procedure note below.     # AK - nasal tip.  - Cryotherapy performed today. See procedure note below.     Procedures Performed:   - Cryotherapy procedure note, location(s): see above. After verbal consent and discussion of risks and benefits including, but not limited to, dyspigmentation/scar, blister, and pain, 2 lesion(s) was(were) treated with 1-2 mm freeze border for 1-2 cycles with liquid nitrogen. Post cryotherapy instructions were  provided.    Follow-up: 1 year in-person for TBSE, or earlier for new or changing lesions    Staff and Scribe:     Scribe Disclosure:   I, Nessa Ryan, am serving as a scribe to document services personally performed by Marisela Juarez MD based on data collection and the provider's statements to me.      Provider Disclosure:   The documentation recorded by the scribe accurately reflects the services I personally performed and the decisions made by me.    Marisela Juarez MD    Department of Dermatology  [facility] [place] [place]: Phone: [phone], Fax:[phone]  [facility]: Phone: [phone], Fax: [phone]   ____________________________________________    CC: Skin Check (No hx of skin cancer; SK on L cheek, and AK on tip of nose, R infraorbital ridge )    HPI:  Mr. Gatito Collier is a(n) 58 year old male who presents today as a return patient for a skin check.    He is concerned with AK on the tip of his nose, along with an SK on his left cheek.      Also, there is a spot on the right infraorbital ridge.    Patient is otherwise feeling well, without additional skin concerns.    Labs Reviewed:  N/A    Physical Exam:  Vitals: There were no vitals taken for this visit.  SKIN: Total skin excluding the undergarment areas was performed. The exam included the head/face, neck, both arms, chest, back, abdomen, both legs, digits and/or nails.   - There is no erythema, telangectasias, nodularity, or pigmentation on the sites of prior DN.   - There is an erythematous macule with overyling adherent scale on the nasal tip.  - There is a tan to brown waxy stuck on papule with surrounding erythema on the left cheek.   - No other lesions of concern on areas examined.     Medications:  Current Outpatient Medications   Medication    fluticasone (FLONASE) 50 MCG/ACT nasal spray    loratadine (CLARITIN) 10 MG tablet     Multiple Vitamins-Minerals (PRESERVISION AREDS 2) CAPS     No current facility-administered medications for this visit.      Past Medical History:   Patient Active Problem List   Diagnosis    Allergic rhinitis    Benign neoplasm of skin    CARDIOVASCULAR SCREENING; LDL GOAL LESS THAN 160    History of dysplastic nevus    Acute medial meniscus tear, left, initial encounter     Past Medical History:   Diagnosis Date    Allergic rhinitis, cause unspecified     Allergic rhinitis    Benign neoplasm of skin, site unspecified     dysplastic nevi    Calculus of gallbladder without mention of cholecystitis or obstruction     Cholelithiasis    History of dysplastic nevus     Unspecified disease of pancreas 2/00?    Pancreatitis/ Gall stone        CC No referring provider defined for this encounter. on close of this encounter.

## 2023-12-26 ENCOUNTER — LAB REQUISITION (OUTPATIENT)
Dept: LAB | Facility: CLINIC | Age: 58
End: 2023-12-26

## 2023-12-26 LAB — SARS-COV-2 RNA RESP QL NAA+PROBE: POSITIVE

## 2023-12-26 PROCEDURE — 87635 SARS-COV-2 COVID-19 AMP PRB: CPT | Performed by: INTERNAL MEDICINE

## 2024-05-26 ENCOUNTER — HEALTH MAINTENANCE LETTER (OUTPATIENT)
Age: 59
End: 2024-05-26

## 2024-11-15 NOTE — PATIENT INSTRUCTIONS
Wound Care After a Biopsy    What is a skin biopsy?  A skin biopsy allows the doctor to examine a very small piece of tissue under the microscope to determine the diagnosis and the best treatment for the skin condition. A local anesthetic (numbing medicine)  is injected with a very small needle into the skin area to be tested. A small piece of skin is taken from the area. Sometimes a suture (stitch) is used.     What are the risks of a skin biopsy?  I will experience scar, bleeding, swelling, pain, crusting and redness. I may experience incomplete removal or recurrence. Risks of this procedure are excessive bleeding, bruising, infection, nerve damage, numbness, thick (hypertrophic or keloidal) scar and non-diagnostic biopsy.    How should I care for my wound for the first 24 hours?  Keep the wound dry and covered for 24 hours  If it bleeds, hold direct pressure on the area for 15 minutes. If bleeding does not stop then go to the emergency room  Avoid strenuous exercise the first 1-2 days or as your doctor instructs you    How should I care for the wound after 24 hours?  After 24 hours, remove the bandage  You may bathe or shower as normal  If you had a scalp biopsy, you can shampoo as usual and can use shower water to clean the biopsy site daily  Clean the wound twice a day with gentle soap and water  Do not scrub, be gentle  Apply white petroleum/Vaseline after cleaning the wound with a cotton swab or a clean finger, and keep the site covered with a Bandaid /bandage. Bandages are not necessary with a scalp biopsy  If you are unable to cover the site with a Bandaid /bandage, re-apply ointment 2-3 times a day to keep the site moist. Moisture will help with healing  Avoid strenuous activity for first 1-2 days  Avoid lakes, rivers, pools, and oceans until the stitches are removed or the site is healed    How do I clean my wound?  Wash hands thoroughly with soap or use hand  before all wound care  Clean the  wound with gentle soap and water  Apply white petroleum/Vaseline  to wound after it is clean  Replace the Bandaid /bandage to keep the wound covered for the first few days or as instructed by your doctor  If you had a scalp biopsy, warm shower water to the area on a daily basis should suffice    What should I use to clean my wound?   Cotton-tipped applicators (Qtips )  White petroleum jelly (Vaseline ). Use a clean new container and use Q-tips to apply.  Bandaids   as needed  Gentle soap     How should I care for my wound long term?  Do not get your wound dirty  Keep up with wound care for one week or until the area is healed.  A small scab will form and fall off by itself when the area is completely healed. The area will be red and will become pink in color as it heals. Sun protection is very important for how your scar will turn out. Sunscreen with an SPF 30 or greater is recommended once the area is healed.  If you have stitches, stitches need to be removed in 7 days on the face/neck, or 10-14 days on the body days. You may return to our clinic for this or you may have it done locally at your doctor s office.  You should have some soreness but it should be mild and slowly go away over several days. Talk to your doctor about using tylenol for pain,    When should I call my doctor?  If you have increased:   Pain or swelling  Pus or drainage (clear or slightly yellow drainage is ok)  Temperature over 100F  Spreading redness or warmth around wound    When will I hear about my results?  The biopsy results can take 2 weeks to come back.  Your results will automatically release to [employer] before your provider has even reviewed them.  The clinic will call you with the results, send you a [employer] message, or have you schedule a follow-up clinic or phone time to discuss the results.  Contact our clinics if you do not hear from us in 2 weeks. If further treatment is needed for a skin cancer, this will be done at either our  [place] or [place] office.    Who should I call with questions?  [facility]: [phone]  [facility]: [phone]  For urgent needs outside of business hours call the [facility] at [phone] and ask for the dermatology resident on call   Patient Education       Proper skin care from [facility]:    -Eliminate harsh soaps as they strip the natural oils from the skin, often resulting in dry itchy skin ( i.e. Dial, Zest, Khmer Spring)  -Use mild soaps such as Cetaphil or Dove Sensitive Skin in the shower. You do not need to use soap on arms, legs, and trunk every time you shower unless visibly soiled.   -Avoid hot or cold showers.  -After showering, lightly dry off and apply moisturizing within 2-3 minutes. This will help trap moisture in the skin.   -Aggressive use of a moisturizer at least 1-2 times a day to the entire body (including -Vanicream, Cetaphil, Aquaphor or Cerave) and moisturize hands after every washing.  -We recommend using moisturizers that come in a tub that needs to be scooped out, not a pump. This has more of an oil base. It will hold moisture in your skin much better than a water base moisturizer. The above recommended are non-pore clogging.      Wear a sunscreen with at least SPF 30 on your face, ears, neck and V of the chest daily. Wear sunscreen on other areas of the body if those areas are exposed to the sun throughout the day. Sunscreens can contain physical and/or chemical blockers. Physical blockers are less likely to clog pores, these include zinc oxide and titanium dioxide. Reapply every two hour and after swimming.     Sunscreen examples: https://www.ewg.org/sunscreen/    UV radiation  UVA radiation remains constant throughout the day and throughout the year. It is a longer wavelength than UVB and therefore penetrates deeper into the skin leading to immediate and delayed tanning, photoaging, and  skin cancer. 70-80% of UVA and UVB radiation occurs between the hours of 10am-2pm.  UVB radiation  UVB radiation causes the most harmful effects and is more significant during the summer months. However, snow and ice can reflect UVB radiation leading to skin damage during the winter months as well. UVB radiation is responsible for tanning, burning, inflammation, delayed erythema (pinkness), pigmentation (brown spots), and skin cancer.     I recommend self monthly full body exams and yearly full body exams with a dermatology provider. If you develop a new or changing lesion please follow up for examination. Most skin cancers are pink and scaly or pink and pearly. However, we do see blue/brown/black skin cancers.  Consider the ABCDEs of melanoma when giving yourself your monthly full body exam ( don't forget the groin, buttocks, feet, toes, etc). A-asymmetry, B-borders, C-color, D-diameter, E-elevation or evolving. If you see any of these changes please follow up in clinic. If you cannot see your back I recommend purchasing a hand held mirror to use with a larger wall mirror.       Checking for Skin Cancer  You can find cancer early by checking your skin each month. There are 3 kinds of skin cancer. They are melanoma, basal cell carcinoma, and squamous cell carcinoma. Doing monthly skin checks is the best way to find new marks or skin changes. Follow the instructions below for checking your skin.   The ABCDEs of checking moles for melanoma   Check your moles or growths for signs of melanoma using ABCDE:   Asymmetry: the sides of the mole or growth don t match  Border: the edges are ragged, notched, or blurred  Color: the color within the mole or growth varies  Diameter: the mole or growth is larger than 6 mm (size of a pencil eraser)  Evolving: the size, shape, or color of the mole or growth is changing (evolving is not shown in the images below)    Checking for other types of skin cancer  Basal cell carcinoma or  squamous cell carcinoma have symptoms such as:     A spot or mole that looks different from all other marks on your skin  Changes in how an area feels, such as itching, tenderness, or pain  Changes in the skin's surface, such as oozing, bleeding, or scaliness  A sore that does not heal  New swelling or redness beyond the border of a mole    Who s at risk?  Anyone can get skin cancer. But you are at greater risk if you have:   Fair skin, light-colored hair, or light-colored eyes  Many moles or abnormal moles on your skin  A history of sunburns from sunlight or tanning beds  A family history of skin cancer  A history of exposure to radiation or chemicals  A weakened immune system  If you have had skin cancer in the past, you are at risk for recurring skin cancer.   How to check your skin  Do your monthly skin checkups in front of a full-length mirror. Check all parts of your body, including your:   Head (ears, face, neck, and scalp)  Torso (front, back, and sides)  Arms (tops, undersides, upper, and lower armpits)  Hands (palms, backs, and fingers, including under the nails)  Buttocks and genitals  Legs (front, back, and sides)  Feet (tops, soles, toes, including under the nails, and between toes)  If you have a lot of moles, take digital photos of them each month. Make sure to take photos both up close and from a distance. These can help you see if any moles change over time.   Most skin changes are not cancer. But if you see any changes in your skin, call your doctor right away. Only he or she can diagnose a problem. If you have skin cancer, seeing your doctor can be the first step toward getting the treatment that could save your life.   [employer] last reviewed this educational content on 4/1/2019 2000-2020 [employer]. [address], [place] [address]. All rights reserved. This information is not intended as a substitute for professional medical care. Always follow your healthcare  professional's instructions.       When should I call my doctor?  If you are worsening or not improving, please, contact us or seek urgent care as noted below.     Who should I call with questions (adults)?  [facility] (adult and pediatric): [phone]  [facility] (adult): [phone]  [facility] ([facility] and [place]) [phone]  For urgent needs outside of business hours call the [facility] at [phone] and ask for the dermatology resident on call to be paged  If this is a medical emergency and you are unable to reach an ER, Call [phone]      If you need a prescription refill, please contact your pharmacy. Refills are approved or denied by our Physicians during normal business hours, Monday through Fridays  Per office policy, refills will not be granted if you have not been seen within the past year (or sooner depending on your child's condition The ABCDEs of Melanoma    Skin cancer can develop anywhere on the skin. Ask someone for help when checking your skin, especially in hard to see places. If you notice a mole different from others, or that changes, enlarges, itches, or bleeds (even if it is small), you should see a dermatologist.

## 2024-11-15 NOTE — PROGRESS NOTES
[facility] Dermatology Note  Encounter Date: Dec 5, 2024  Office Visit     Reviewed patients past medical history and pertinent chart review prior to patients visit today.     Dermatology Problem List:  Last skin check: 12/05/24    0. NUB left lower back, shave biopsy 12/05/24 .    1.H/O DN  - Bx 9/3/19, L Forearm - Mild Atypia  - Bx 1/13/17, L Paraspinal Back - Compound nevus with mild dysplasia  - Bx 1/23/15, L Lower back - Junctional dysplastic nevus with mild atypia - narrowly excised  - Bx 5/19/15, Mid Back - Compound dysplastic nevus with mild atypia  - Bx 5/19/15, R Lower Back - Compound dysplastic nevus with mild to moderate atypia  - Bx 5/19/15, Central Abdomen - Compound dysplastic nevus with mild to moderate atypia - Exc. for recurrent pigment 2/2/2016  - Bx 10/21/14, Left Chest - Compound dysplastic nevus with severe atypia, s/p excision 1/13/15  2. Lesion on left calf excised as a child, approximately age 4  3. AK  - left nasal tip, s/p cryo 1/9/20 and 3/30/23  4. ISK  - left cheek x 1, s/p cryo 3/30/23    Personal Hx: see above     _________________________________________    Assessment & Plan:     # Neoplasm of uncertain behavior:  left lower back  DDx includes DN vs MM. Shave biopsy today.    Procedure Note: Biopsy by shave technique  The risks and benefits of the procedure were described to the patient. These include but are not limited to bleeding, infection, scar, incomplete removal, and non-diagnostic biopsy. Verbal informed consent was obtained. The above site(s) was cleansed with an alcohol pad and injected with 1% lidocaine with epinephrine. Once anesthesia was obtained, a biopsy(ies) was performed with Gilette blade. The tissue(s) was placed in a labeled container(s) with formalin and sent to pathology. Hemostasis was achieved with aluminum chloride. Vaseline and a bandage were applied to the wound(s). The patient tolerated the procedure well and was given post biopsy care  "instructions.    # History of dysplastic nevi, no clinical evidence of recurrence.   -Recommended regular skin checks.     # Benign skin findings including: seborrheic keratoses, cherry angioma, lentigines and benign nevi.   - No further intervention required. Patient to report changes.   - Patient reassured of the benign nature of these lesions.    #Signs and Symptoms of non-melanoma skin cancer and ABCDEs of melanoma reviewed with patient. Patient encouraged to perform monthly self skin exams and educated on how to perform them. UV precautions reviewed with patient. Patient was asked about new or changing moles/lesions on body.     #Reviewed Sunscreen: Apply 20 minutes prior to going outdoors and reapply every two hours, when wet or sweating. We recommend using an SPF 30 or higher, and to use one that is water resistant.       Follow-up: 1 year for follow up full body skin exam, prn for new or changing lesions or new concerns    Ana Escobedo PA-C  [facility]  Dermatology     ____________________________________________    CC: Skin Check (Pt states, \"Here for a fbsc, nothing of major concern at this time, I am covered in moles so do my yearly, Dr. Juarez froze a spot on my nose last time as well\")    HPI:  Mr. Gatito Collier is a(n) 59 year old male who presents today as a return patient for a full body skin cancer screening. Patient has concerns today about a spot on his nose. Pt is being diligent with photoprotection.     Patient is otherwise feeling well, without additional skin concerns.     Physical Exam:  Vitals: Ht 1.829 m (6')   Wt 83.9 kg (185 lb)   BMI 25.09 kg/m    SKIN: Total skin excluding the genitalia areas was performed. The exam included the head/face, neck, both arms, chest, back, abdomen, both legs, digits, mons pubis, buttock and nails.   - NUB, left lower back,dark brown asymmetric macule   -nose, no overlying epidermal skin changes  -several 1-2mm red dome shaped symmetric " papules scattered on the trunk  -multiple tan/brown flat round macules and raised papules scattered throughout trunk, extremities and head. No worrisome features for malignancy noted on examination.  -scattered tan, homogenous macules scattered on sun exposed areas of trunk, extremities and face.   -scattered waxy, stuck on tan/brown papules and patches on the trunk     - No other lesions of concern on areas examined.     Medications:  Current Outpatient Medications   Medication Sig Dispense Refill    fluticasone (FLONASE) 50 MCG/ACT nasal spray Spray 1 spray into both nostrils daily      loratadine (CLARITIN) 10 MG tablet Take 10 mg by mouth as needed       Multiple Vitamins-Minerals (PRESERVISION AREDS 2) CAPS Take by mouth daily       No current facility-administered medications for this visit.      Past Medical History:   Patient Active Problem List   Diagnosis    Allergic rhinitis    Benign neoplasm of skin    CARDIOVASCULAR SCREENING; LDL GOAL LESS THAN 160    History of dysplastic nevus    Acute medial meniscus tear, left, initial encounter     Past Medical History:   Diagnosis Date    Allergic rhinitis, cause unspecified     Allergic rhinitis    Benign neoplasm of skin, site unspecified     dysplastic nevi    Calculus of gallbladder without mention of cholecystitis or obstruction     Cholelithiasis    History of dysplastic nevus     Unspecified disease of pancreas 2/00?    Pancreatitis/ Gall stone       CC No referring provider defined for this encounter. on close of this encounter.

## 2024-12-05 ENCOUNTER — OFFICE VISIT (OUTPATIENT)
Dept: DERMATOLOGY | Facility: CLINIC | Age: 59
End: 2024-12-05
Payer: COMMERCIAL

## 2024-12-05 VITALS — BODY MASS INDEX: 25.06 KG/M2 | WEIGHT: 185 LBS | HEIGHT: 72 IN

## 2024-12-05 DIAGNOSIS — Z86.018 HISTORY OF DYSPLASTIC NEVUS: ICD-10-CM

## 2024-12-05 DIAGNOSIS — L82.1 SEBORRHEIC KERATOSIS: Primary | ICD-10-CM

## 2024-12-05 DIAGNOSIS — D18.01 CHERRY ANGIOMA: ICD-10-CM

## 2024-12-05 DIAGNOSIS — D48.5 NEOPLASM OF UNCERTAIN BEHAVIOR OF SKIN: ICD-10-CM

## 2024-12-05 DIAGNOSIS — L81.4 LENTIGINES: ICD-10-CM

## 2024-12-05 ASSESSMENT — PAIN SCALES - GENERAL: PAINLEVEL_OUTOF10: NO PAIN (0)

## 2024-12-05 NOTE — LETTER
12/5/2024      Gatito Collier  [address] [address]      Dear Colleague,    Thank you for referring your patient, Gatito Collier, to the [facility]. Please see a copy of my visit note below.    [facility] Dermatology Note  Encounter Date: Dec 5, 2024  Office Visit     Reviewed patients past medical history and pertinent chart review prior to patients visit today.     Dermatology Problem List:  Last skin check: 12/05/24    0. NUB left lower back, shave biopsy 12/05/24 .    1.H/O DN  - Bx 9/3/19, L Forearm - Mild Atypia  - Bx 1/13/17, L Paraspinal Back - Compound nevus with mild dysplasia  - Bx 1/23/15, L Lower back - Junctional dysplastic nevus with mild atypia - narrowly excised  - Bx 5/19/15, Mid Back - Compound dysplastic nevus with mild atypia  - Bx 5/19/15, R Lower Back - Compound dysplastic nevus with mild to moderate atypia  - Bx 5/19/15, Central Abdomen - Compound dysplastic nevus with mild to moderate atypia - Exc. for recurrent pigment 2/2/2016  - Bx 10/21/14, Left Chest - Compound dysplastic nevus with severe atypia, s/p excision 1/13/15  2. Lesion on left calf excised as a child, approximately age 4  3. AK  - left nasal tip, s/p cryo 1/9/20 and 3/30/23  4. ISK  - left cheek x 1, s/p cryo 3/30/23    Personal Hx: see above     _________________________________________    Assessment & Plan:     # Neoplasm of uncertain behavior:  left lower back  DDx includes DN vs MM. Shave biopsy today.    Procedure Note: Biopsy by shave technique  The risks and benefits of the procedure were described to the patient. These include but are not limited to bleeding, infection, scar, incomplete removal, and non-diagnostic biopsy. Verbal informed consent was obtained. The above site(s) was cleansed with an alcohol pad and injected with 1% lidocaine with epinephrine. Once anesthesia was obtained, a biopsy(ies) was performed with Gilette blade. The  "tissue(s) was placed in a labeled container(s) with formalin and sent to pathology. Hemostasis was achieved with aluminum chloride. Vaseline and a bandage were applied to the wound(s). The patient tolerated the procedure well and was given post biopsy care instructions.    # History of dysplastic nevi, no clinical evidence of recurrence.   -Recommended regular skin checks.     # Benign skin findings including: seborrheic keratoses, cherry angioma, lentigines and benign nevi.   - No further intervention required. Patient to report changes.   - Patient reassured of the benign nature of these lesions.    #Signs and Symptoms of non-melanoma skin cancer and ABCDEs of melanoma reviewed with patient. Patient encouraged to perform monthly self skin exams and educated on how to perform them. UV precautions reviewed with patient. Patient was asked about new or changing moles/lesions on body.     #Reviewed Sunscreen: Apply 20 minutes prior to going outdoors and reapply every two hours, when wet or sweating. We recommend using an SPF 30 or higher, and to use one that is water resistant.       Follow-up: 1 year for follow up full body skin exam, prn for new or changing lesions or new concerns    Ana Escobedo PA-C  [facility]  Dermatology     ____________________________________________    CC: Skin Check (Pt states, \"Here for a fbsc, nothing of major concern at this time, I am covered in moles so do my yearly, Dr. Juarez froze a spot on my nose last time as well\")    HPI:  Mr. Gatito Collier is a(n) 59 year old male who presents today as a return patient for a full body skin cancer screening. Patient has concerns today about a spot on his nose. Pt is being diligent with photoprotection.     Patient is otherwise feeling well, without additional skin concerns.     Physical Exam:  Vitals: Ht 1.829 m (6')   Wt 83.9 kg (185 lb)   BMI 25.09 kg/m    SKIN: Total skin excluding the genitalia areas was performed. The exam " included the head/face, neck, both arms, chest, back, abdomen, both legs, digits, mons pubis, buttock and nails.   - NUB, left lower back,dark brown asymmetric macule   -nose, no overlying epidermal skin changes  -several 1-2mm red dome shaped symmetric papules scattered on the trunk  -multiple tan/brown flat round macules and raised papules scattered throughout trunk, extremities and head. No worrisome features for malignancy noted on examination.  -scattered tan, homogenous macules scattered on sun exposed areas of trunk, extremities and face.   -scattered waxy, stuck on tan/brown papules and patches on the trunk     - No other lesions of concern on areas examined.     Medications:  Current Outpatient Medications   Medication Sig Dispense Refill     fluticasone (FLONASE) 50 MCG/ACT nasal spray Spray 1 spray into both nostrils daily       loratadine (CLARITIN) 10 MG tablet Take 10 mg by mouth as needed        Multiple Vitamins-Minerals (PRESERVISION AREDS 2) CAPS Take by mouth daily       No current facility-administered medications for this visit.      Past Medical History:   Patient Active Problem List   Diagnosis     Allergic rhinitis     Benign neoplasm of skin     CARDIOVASCULAR SCREENING; LDL GOAL LESS THAN 160     History of dysplastic nevus     Acute medial meniscus tear, left, initial encounter     Past Medical History:   Diagnosis Date     Allergic rhinitis, cause unspecified     Allergic rhinitis     Benign neoplasm of skin, site unspecified     dysplastic nevi     Calculus of gallbladder without mention of cholecystitis or obstruction     Cholelithiasis     History of dysplastic nevus      Unspecified disease of pancreas 2/00?    Pancreatitis/ Gall stone       CC No referring provider defined for this encounter. on close of this encounter.      The following medication was given:     MEDICATION:  Lidocaine with epinephrine 1% [id number]  ROUTE: SQ  SITE: see procedure note  DOSE: 0.3 ml  LOT #:  [id number]  : [email or web address]  EXPIRATION DATE: 06-30-26  NDC#: 48743-237-89  Was there drug waste? Yes, 0.7 ml  Multi-dose vial: Yes    Carley Parker MA  December 5, 2024      Again, thank you for allowing me to participate in the care of your patient.        Sincerely,        Ana Escobedo PA-C

## 2024-12-05 NOTE — PROGRESS NOTES
The following medication was given:     MEDICATION:  Lidocaine with epinephrine 1% [id number]  ROUTE: SQ  SITE: see procedure note  DOSE: 0.3 ml  LOT #: [id number]  : [email or web address]  EXPIRATION DATE: 06-30-26  NDC#: 24170-254-36  Was there drug waste? Yes, 0.7 ml  Multi-dose vial: Yes    Carley Parker MA  December 5, 2024

## 2024-12-09 ENCOUNTER — TELEPHONE (OUTPATIENT)
Dept: DERMATOLOGY | Facility: CLINIC | Age: 59
End: 2024-12-09
Payer: COMMERCIAL

## 2024-12-09 NOTE — TELEPHONE ENCOUNTER
Pt read [employer] message and will call the clinic with any questions or concerns.   Eli Robison RN on 12/9/2024 at 8:20 AM        Final Diagnosis  Left lower back:  - Lentiginous compound melanocytic nevus with mild atypia - (see description)  Electronically signed by Irvin Finley MD on 12/6/2024 at  4:37 PM          Charli Reyes,     We received your biopsy results back. I wanted to let you know the biopsy of your left lower back showed a mildly dysplastic (or atypical) nevus. Dysplastic nevi are moles that have atypical cells. Atypical cells are graded as mild, moderate, or severe. Mild and moderate are considered variants of normal. Nothing further needs to be done for this mole as long as brown pigment does not return. If you ever see the brown mole color coming back inside the scar please come back for me to take a look at it. If it recurs we will want it to be removed further. I would recommend a skin check in one year. If you have any further questions please send me a message. Have a good rest of your day.     Ana Escobedo PA-C  Dermatology  Written by Ana Escobedo PA-C on 12/9/2024  7:35 AM CST  Seen by patient Gatito Collier on 12/9/2024  7:39 AM

## 2025-06-14 ENCOUNTER — HEALTH MAINTENANCE LETTER (OUTPATIENT)
Age: 60
End: 2025-06-14

## (undated) DEVICE — PACK ARTHROSCOPY CUSTOM ASC

## (undated) DEVICE — LINEN DRAPE 54X72" 5467

## (undated) DEVICE — SUCTION MANIFOLD NEPTUNE 2 SYS 4 PORT 0702-020-000

## (undated) DEVICE — LINEN TOWEL PACK X5 5464

## (undated) DEVICE — SOL NACL 0.9% IRRIG 3000ML BAG 2B7477

## (undated) DEVICE — PAD ARMBOARD FOAM EGGCRATE COVIDEN 3114367

## (undated) DEVICE — SU ETHILON 3-0 PS-1 18" 1663H

## (undated) DEVICE — BUR ARTHREX COOLCUT SABRE 4.0MMX13CM AR-8400SR

## (undated) DEVICE — GLOVE PROTEXIS POWDER FREE SMT 8.0  2D72PT80X

## (undated) DEVICE — TUBING SYSTEM ARTHREX PATIENT REDEUCE AR-6421

## (undated) DEVICE — PREP DURAPREP 26ML APL 8630

## (undated) DEVICE — GLOVE PROTEXIS BLUE W/NEU-THERA 8.0  2D73EB80

## (undated) RX ORDER — FENTANYL CITRATE 50 UG/ML
INJECTION, SOLUTION INTRAMUSCULAR; INTRAVENOUS
Status: DISPENSED
Start: 2021-12-22

## (undated) RX ORDER — KETOROLAC TROMETHAMINE 30 MG/ML
INJECTION, SOLUTION INTRAMUSCULAR; INTRAVENOUS
Status: DISPENSED
Start: 2021-12-22

## (undated) RX ORDER — FENTANYL CITRATE 50 UG/ML
INJECTION, SOLUTION INTRAMUSCULAR; INTRAVENOUS
Status: DISPENSED
Start: 2017-12-01

## (undated) RX ORDER — CEFAZOLIN SODIUM 2 G/50ML
SOLUTION INTRAVENOUS
Status: DISPENSED
Start: 2021-12-22

## (undated) RX ORDER — GABAPENTIN 300 MG/1
CAPSULE ORAL
Status: DISPENSED
Start: 2021-12-22

## (undated) RX ORDER — PROPOFOL 10 MG/ML
INJECTION, EMULSION INTRAVENOUS
Status: DISPENSED
Start: 2021-12-22

## (undated) RX ORDER — ACETAMINOPHEN 325 MG/1
TABLET ORAL
Status: DISPENSED
Start: 2021-12-22

## (undated) RX ORDER — LIDOCAINE HYDROCHLORIDE 20 MG/ML
INJECTION, SOLUTION EPIDURAL; INFILTRATION; INTRACAUDAL; PERINEURAL
Status: DISPENSED
Start: 2021-12-22